# Patient Record
Sex: MALE | Race: WHITE | URBAN - METROPOLITAN AREA
[De-identification: names, ages, dates, MRNs, and addresses within clinical notes are randomized per-mention and may not be internally consistent; named-entity substitution may affect disease eponyms.]

---

## 2024-01-08 ENCOUNTER — TELEPHONE (OUTPATIENT)
Dept: ENT CLINIC | Age: 69
End: 2024-01-08

## 2024-01-08 ENCOUNTER — PROCEDURE VISIT (OUTPATIENT)
Dept: AUDIOLOGY | Age: 69
End: 2024-01-08
Payer: COMMERCIAL

## 2024-01-08 ENCOUNTER — OFFICE VISIT (OUTPATIENT)
Dept: ENT CLINIC | Age: 69
End: 2024-01-08
Payer: COMMERCIAL

## 2024-01-08 VITALS
HEART RATE: 99 BPM | HEIGHT: 69 IN | WEIGHT: 215 LBS | OXYGEN SATURATION: 96 % | DIASTOLIC BLOOD PRESSURE: 77 MMHG | SYSTOLIC BLOOD PRESSURE: 110 MMHG | RESPIRATION RATE: 18 BRPM | BODY MASS INDEX: 31.84 KG/M2

## 2024-01-08 DIAGNOSIS — H93.13 TINNITUS OF BOTH EARS: ICD-10-CM

## 2024-01-08 DIAGNOSIS — Z01.818 PRE-OP TESTING: ICD-10-CM

## 2024-01-08 DIAGNOSIS — H61.23 BILATERAL IMPACTED CERUMEN: ICD-10-CM

## 2024-01-08 DIAGNOSIS — H90.A32 MIXED CONDUCTIVE AND SENSORINEURAL HEARING LOSS OF LEFT EAR WITH RESTRICTED HEARING OF RIGHT EAR: Primary | ICD-10-CM

## 2024-01-08 DIAGNOSIS — H91.8X3 ASYMMETRICAL HEARING LOSS: ICD-10-CM

## 2024-01-08 DIAGNOSIS — H69.92 DYSFUNCTION OF LEFT EUSTACHIAN TUBE: ICD-10-CM

## 2024-01-08 DIAGNOSIS — H90.6 MIXED HEARING LOSS, BILATERAL: Primary | ICD-10-CM

## 2024-01-08 PROCEDURE — 1123F ACP DISCUSS/DSCN MKR DOCD: CPT

## 2024-01-08 PROCEDURE — 69210 REMOVE IMPACTED EAR WAX UNI: CPT

## 2024-01-08 PROCEDURE — 92557 COMPREHENSIVE HEARING TEST: CPT | Performed by: AUDIOLOGIST

## 2024-01-08 PROCEDURE — 92567 TYMPANOMETRY: CPT | Performed by: AUDIOLOGIST

## 2024-01-08 PROCEDURE — 99204 OFFICE O/P NEW MOD 45 MIN: CPT

## 2024-01-08 RX ORDER — FLUTICASONE PROPIONATE 50 MCG
2 SPRAY, SUSPENSION (ML) NASAL DAILY
Qty: 16 G | Refills: 1 | Status: SHIPPED | OUTPATIENT
Start: 2024-01-08

## 2024-01-08 ASSESSMENT — ENCOUNTER SYMPTOMS
BACK PAIN: 0
VOMITING: 0
EYE PAIN: 0
SINUS PRESSURE: 0
COUGH: 0
SORE THROAT: 0
SHORTNESS OF BREATH: 0
EYE DISCHARGE: 0
ALLERGIC/IMMUNOLOGIC NEGATIVE: 1
DIARRHEA: 0
RHINORRHEA: 0

## 2024-01-08 NOTE — TELEPHONE ENCOUNTER
[facility] to authorize order with patient insurance. Patient is scheduled for MRI IAC with radiology on 1/29/24 @ 11:00am Patient has been notified of date and time and that they need to arrive at 10:15am. Patient was informed no metals/jewelry. Patient instructed to park in [facility] parking lot and report to registration. Patient's wife Nevaeh verbalized understanding.      Electronically signed by Shauna Stokes MA on 1/8/24 at 1:37 PM EST

## 2024-01-08 NOTE — TELEPHONE ENCOUNTER
I called [facility] at [phone] to obtain last hearing test from October. Office is not open on Mondays, will try again tomorrow.     Electronically signed by Shauna Stokes MA on 1/8/24 at 8:47 AM EST

## 2024-01-08 NOTE — PROGRESS NOTES
Pre-op testing  Bun/Creatinine Ratio w/ EGFR            Plan:      Les is a 68-year-old male who presents to the office today as a new patient for evaluation of bilateral hearing loss.  Patient states he has several year history of occupational noise exposure but states that 7 months ago noticed drastic changes in his hearing.  He was previously seen by an audiologist and treated with hearing aids.  He states that his hearing has fluctuated since that time and feels that the hearing aids are not working.  Examination revealed bilateral partially obstructed cerumen impactions.  As noted above bilateral cerumen removal was completed in the office and the patient tolerated the procedure well.  Due to the fluctuating hearing loss a complete audiogram was performed and revealed Moderately severe to severe mixed hearing loss in the left ear and severe to profound mixed hearing loss in the right ear  with noted asymmetry to the right ear.  Due to the left-sided eustachian tube dysfunction and nasal congestion I would like the patient to initiate use of Flonase 1 spray to each nostril twice daily.  For the noted asymmetry an MRI of the IAC with contrast will be ordered to rule out acoustic neuroma.  Precontrast BUN and creatinine will be obtained.  Due to the severity of his hearing loss I will have him follow-up with Dr. Marcia De Leon for further evaluation up to including possible right-sided CI patient did verbalize understanding was in agreement to this plan.  He was instructed to call the office for any new or worsening symptoms prior to his next appointment.    Follow up in 6 week(s)    RX given today:  Luz Cordon MSN, FNP-BC  [facility] - Ear, Nose [facility] Throat    The information contained in this note has been dictated using drug and medical speech recognition software and may contain errors

## 2024-01-09 NOTE — TELEPHONE ENCOUNTER
[facility] spoke with [facility], Jared will have hearing test faxed to our office.     Electronically signed by Shauna Stokes MA on 1/9/24 at 11:20 AM EST

## 2024-01-09 NOTE — TELEPHONE ENCOUNTER
I spoke with Lalit at [facility] [place]. He agreed to send hearing test but in return requested hearing test completed in our office. I spoke with patient's wife Jrodyn who stated she is not comfortable with us faxing  hearing test to their facility.     Electronically signed by Shauna Stokes MA on 1/9/24 at 12:28 PM EST

## 2024-01-10 NOTE — PROGRESS NOTES
This patient was referred for audiometric/tympanometric testing by CASSIDY Diaz due to hearing loss and tinnitus.      Audiometry using pure tone air and bone conduction revealed a severe to profound mixed hearing loss in the right ear and a severe sensorineural hearing loss in the left ear   Reliability was good. Speech reception thresholds were in fair agreement with the pure tone averages, in the left ear and good agreement in the right ear.  Speech discrimination scores were fair, in the left ear and poor in the right ear.     Tympanometry revealed negative middle ear pressure (-113 daPa), in the left ear and peak pressure and compliance within normal limits in the right ear.           The results were reviewed with the patient and CNP.     Recommendations for follow up will be made pending physician consult.    Daniella Whitmore/CCC-A  [place] Lic # [id number]

## 2024-01-22 ENCOUNTER — HOSPITAL ENCOUNTER (OUTPATIENT)
Age: 69
Discharge: HOME | End: 2024-01-22
Payer: COMMERCIAL

## 2024-01-22 DIAGNOSIS — Z01.818 PRE-OP TESTING: ICD-10-CM

## 2024-01-22 LAB
BUN SERPL-MCNC: 17 MG/DL (ref 6–23)
CREAT SERPL-MCNC: 1.2 MG/DL (ref 0.7–1.2)
GFR SERPL CREATININE-BSD FRML MDRD: >60 ML/MIN/1.73M2

## 2024-01-22 PROCEDURE — 36415 COLL VENOUS BLD VENIPUNCTURE: CPT

## 2024-01-22 PROCEDURE — 84520 ASSAY OF UREA NITROGEN: CPT

## 2024-01-22 PROCEDURE — 82565 ASSAY OF CREATININE: CPT

## 2024-01-24 ENCOUNTER — TELEPHONE (OUTPATIENT)
Dept: ADMINISTRATIVE | Age: 69
End: 2024-01-24

## 2024-01-24 NOTE — TELEPHONE ENCOUNTER
Patient Appointment Form:      PCP: Vanessa  Referring: pcp    Has the Patient:    Seen a Cardiologist? no    Had a heart catheterization? no    Had heart surgery? no    Had a stress test or nuclear stress test? no    Had an echocardiogram? no    Had a vascular ultrasound? no    Had a 24/48 heart monitor or extended cardiac event monitor? no    Had recent blood work in the last 6 months? no    Had a pacemaker/ICD/ILR implant? no    Seen an Electrophysiologist? no        Will send records via: in Epic      Date & time of appointment:  jorge Hanson 2/12 at 8:15

## 2024-01-29 ENCOUNTER — HOSPITAL ENCOUNTER (OUTPATIENT)
Dept: MRI IMAGING | Age: 69
Discharge: HOME | End: 2024-01-31
Payer: COMMERCIAL

## 2024-01-29 DIAGNOSIS — H91.8X3 ASYMMETRICAL HEARING LOSS: ICD-10-CM

## 2024-01-29 PROCEDURE — 70553 MRI BRAIN STEM W/O & W/DYE: CPT

## 2024-01-29 PROCEDURE — A9579 GAD-BASE MR CONTRAST NOS,1ML: HCPCS | Performed by: RADIOLOGY

## 2024-01-29 PROCEDURE — 6360000004 HC RX CONTRAST MEDICATION: Performed by: RADIOLOGY

## 2024-01-29 RX ADMIN — GADOTERIDOL 20 ML: 279.3 INJECTION, SOLUTION INTRAVENOUS at 12:01

## 2024-01-30 ENCOUNTER — TELEPHONE (OUTPATIENT)
Dept: ENT CLINIC | Age: 69
End: 2024-01-30

## 2024-01-30 NOTE — TELEPHONE ENCOUNTER
LVM for patient to find out if he would like to keep appt with [facility] on 2/19 or cancel due to being scheduled with Dr. De Leon on 2/12    Electronically signed by Shauna Stokes MA on 1/30/24 at 1:39 PM EST

## 2024-02-09 PROBLEM — R07.9 CHEST PAIN: Status: ACTIVE | Noted: 2024-02-09

## 2024-02-09 NOTE — PROGRESS NOTES
NEW PATIENT VISIT  Chief Complaint   Patient presents with    New Patient     OV F/U MRI/HL     History of Present Illness:     Les Romero is a 69 y.o. male presenting with a history of noise exposure and sudden worsening 07/2023; wrs right 20% left 72%; MRI IAC done and noted to have normal anatomy; patient of [facility] Cordon; he notes that his hearing fluctuates and he has good and bad days. Today he states it was a good day and his hearing is about the same as his previous test. He has been using flonase once daily. 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; he still works, he has bilateral hearing aids         TOBACCO  Social History     Tobacco Use   Smoking Status Some Days    Types: Cigars   Smokeless Tobacco Never        ALCOHOL   Social History     Substance and Sexual Activity   Alcohol Use Yes    Comment: occ        DRUGHX   Social History     Substance and Sexual Activity   Drug Use Never        CURRENT OUTPATIENT MEDICATIONS:   Outpatient Medications Marked as Taking for the 2/12/24 encounter (Office Visit) with Marcia De Leon MD   Medication Sig Dispense Refill    amLODIPine (NORVASC) 10 MG tablet Take 1 tablet by mouth daily      allopurinol (ZYLOPRIM) 300 MG tablet Take 1 tablet by mouth daily      aspirin 81 MG EC tablet Take 1 tablet by mouth daily      carvedilol (COREG) 3.125 MG tablet Take 1 tablet by mouth 2 times daily      losartan-hydroCHLOROthiazide (HYZAAR) 100-25 MG per tablet Take 1 tablet by mouth daily      fluticasone (FLONASE) 50 MCG/ACT nasal spray 2 sprays by Nasal route daily 2 sprays each nostril once daily 16 g 1        ALLERGIES:   Allergies   Allergen Reactions    Ace Inhibitors        Timing Age of Onset >3 years   Duration Increasing in Severity fluctuating   Days of work missed in last year n/a      Modifying Factors Seasonal variation No        Associated Symptoms Mouth breathing No    Communication concerns Yes    Halitosis No     Family History Family members

## 2024-02-12 ENCOUNTER — OFFICE VISIT (OUTPATIENT)
Dept: ENT CLINIC | Age: 69
End: 2024-02-12
Payer: COMMERCIAL

## 2024-02-12 ENCOUNTER — PROCEDURE VISIT (OUTPATIENT)
Dept: AUDIOLOGY | Age: 69
End: 2024-02-12
Payer: COMMERCIAL

## 2024-02-12 ENCOUNTER — TELEPHONE (OUTPATIENT)
Dept: CARDIOLOGY CLINIC | Age: 69
End: 2024-02-12

## 2024-02-12 ENCOUNTER — OFFICE VISIT (OUTPATIENT)
Dept: CARDIOLOGY CLINIC | Age: 69
End: 2024-02-12
Payer: COMMERCIAL

## 2024-02-12 VITALS — HEIGHT: 69 IN | WEIGHT: 223 LBS | BODY MASS INDEX: 33.03 KG/M2

## 2024-02-12 VITALS
RESPIRATION RATE: 18 BRPM | WEIGHT: 223.8 LBS | HEART RATE: 96 BPM | BODY MASS INDEX: 33.15 KG/M2 | DIASTOLIC BLOOD PRESSURE: 91 MMHG | HEIGHT: 69 IN | SYSTOLIC BLOOD PRESSURE: 122 MMHG

## 2024-02-12 DIAGNOSIS — R07.9 CHEST PAIN, UNSPECIFIED TYPE: ICD-10-CM

## 2024-02-12 DIAGNOSIS — H93.13 TINNITUS OF BOTH EARS: Primary | ICD-10-CM

## 2024-02-12 DIAGNOSIS — H91.8X3 ASYMMETRICAL HEARING LOSS: Primary | ICD-10-CM

## 2024-02-12 DIAGNOSIS — I45.2 RBBB (RIGHT BUNDLE BRANCH BLOCK WITH LEFT ANTERIOR FASCICULAR BLOCK): ICD-10-CM

## 2024-02-12 DIAGNOSIS — H90.3 SENSORINEURAL HEARING LOSS (SNHL) OF BOTH EARS: ICD-10-CM

## 2024-02-12 DIAGNOSIS — R06.02 SOB (SHORTNESS OF BREATH): Primary | ICD-10-CM

## 2024-02-12 DIAGNOSIS — H91.8X3 ASYMMETRICAL HEARING LOSS: ICD-10-CM

## 2024-02-12 DIAGNOSIS — I10 HYPERTENSION, UNSPECIFIED TYPE: ICD-10-CM

## 2024-02-12 DIAGNOSIS — H90.6 MIXED HEARING LOSS, BILATERAL: ICD-10-CM

## 2024-02-12 DIAGNOSIS — R06.02 SOB (SHORTNESS OF BREATH): ICD-10-CM

## 2024-02-12 LAB
ANION GAP SERPL CALCULATED.3IONS-SCNC: 11 MMOL/L (ref 7–16)
BUN BLDV-MCNC: 17 MG/DL (ref 6–23)
CALCIUM SERPL-MCNC: 9.4 MG/DL (ref 8.6–10.2)
CHLORIDE BLD-SCNC: 103 MMOL/L (ref 98–107)
CO2: 23 MMOL/L (ref 22–29)
CREAT SERPL-MCNC: 1.2 MG/DL (ref 0.7–1.2)
GFR SERPL CREATININE-BSD FRML MDRD: >60 ML/MIN/1.73M2
GLUCOSE BLD-MCNC: 105 MG/DL (ref 74–99)
POTASSIUM SERPL-SCNC: 4.6 MMOL/L (ref 3.5–5)
PRO-BNP: 1617 PG/ML (ref 0–125)
SODIUM BLD-SCNC: 137 MMOL/L (ref 132–146)

## 2024-02-12 PROCEDURE — 99205 OFFICE O/P NEW HI 60 MIN: CPT | Performed by: INTERNAL MEDICINE

## 2024-02-12 PROCEDURE — 92553 AUDIOMETRY AIR & BONE: CPT | Performed by: AUDIOLOGIST

## 2024-02-12 PROCEDURE — 3080F DIAST BP >= 90 MM HG: CPT | Performed by: INTERNAL MEDICINE

## 2024-02-12 PROCEDURE — 99215 OFFICE O/P EST HI 40 MIN: CPT | Performed by: OTOLARYNGOLOGY

## 2024-02-12 PROCEDURE — 1123F ACP DISCUSS/DSCN MKR DOCD: CPT | Performed by: OTOLARYNGOLOGY

## 2024-02-12 PROCEDURE — 93000 ELECTROCARDIOGRAM COMPLETE: CPT | Performed by: INTERNAL MEDICINE

## 2024-02-12 PROCEDURE — 3074F SYST BP LT 130 MM HG: CPT | Performed by: INTERNAL MEDICINE

## 2024-02-12 RX ORDER — REGADENOSON 0.08 MG/ML
0.4 INJECTION, SOLUTION INTRAVENOUS
Status: SHIPPED | OUTPATIENT
Start: 2024-02-12

## 2024-02-12 RX ORDER — TORSEMIDE 10 MG/1
10 TABLET ORAL DAILY
Qty: 30 TABLET | Refills: 5 | Status: SHIPPED | OUTPATIENT
Start: 2024-02-12

## 2024-02-12 RX ORDER — AMLODIPINE BESYLATE 10 MG/1
10 TABLET ORAL DAILY
COMMUNITY
End: 2024-02-12 | Stop reason: ALTCHOICE

## 2024-02-12 RX ORDER — CARVEDILOL 6.25 MG/1
6.25 TABLET ORAL 2 TIMES DAILY
Qty: 60 TABLET | Refills: 5 | Status: SHIPPED | OUTPATIENT
Start: 2024-02-12

## 2024-02-12 RX ORDER — ASPIRIN 81 MG/1
81 TABLET ORAL DAILY
COMMUNITY

## 2024-02-12 RX ORDER — ALLOPURINOL 300 MG/1
300 TABLET ORAL DAILY
COMMUNITY

## 2024-02-12 RX ORDER — CARVEDILOL 3.12 MG/1
3.12 TABLET ORAL 2 TIMES DAILY
COMMUNITY
End: 2024-02-12 | Stop reason: ALTCHOICE

## 2024-02-12 RX ORDER — LOSARTAN POTASSIUM AND HYDROCHLOROTHIAZIDE 25; 100 MG/1; MG/1
1 TABLET ORAL DAILY
COMMUNITY
End: 2024-02-12 | Stop reason: ALTCHOICE

## 2024-02-12 RX ORDER — CARVEDILOL 12.5 MG/1
12.5 TABLET ORAL DAILY
COMMUNITY
End: 2024-02-12

## 2024-02-13 ENCOUNTER — HOSPITAL ENCOUNTER (OUTPATIENT)
Dept: CARDIOLOGY | Age: 69
Discharge: HOME | End: 2024-02-15
Attending: INTERNAL MEDICINE
Payer: COMMERCIAL

## 2024-02-13 VITALS
WEIGHT: 223 LBS | SYSTOLIC BLOOD PRESSURE: 122 MMHG | HEART RATE: 96 BPM | DIASTOLIC BLOOD PRESSURE: 80 MMHG | BODY MASS INDEX: 33.03 KG/M2 | HEIGHT: 69 IN | RESPIRATION RATE: 16 BRPM

## 2024-02-13 VITALS
SYSTOLIC BLOOD PRESSURE: 122 MMHG | DIASTOLIC BLOOD PRESSURE: 91 MMHG | WEIGHT: 223 LBS | BODY MASS INDEX: 33.03 KG/M2 | HEIGHT: 69 IN

## 2024-02-13 DIAGNOSIS — R07.9 CHEST PAIN, UNSPECIFIED TYPE: ICD-10-CM

## 2024-02-13 DIAGNOSIS — I45.2 RBBB (RIGHT BUNDLE BRANCH BLOCK WITH LEFT ANTERIOR FASCICULAR BLOCK): ICD-10-CM

## 2024-02-13 DIAGNOSIS — R06.02 SOB (SHORTNESS OF BREATH): ICD-10-CM

## 2024-02-13 DIAGNOSIS — I10 HYPERTENSION, UNSPECIFIED TYPE: ICD-10-CM

## 2024-02-13 DIAGNOSIS — R06.02 SOB (SHORTNESS OF BREATH): Primary | ICD-10-CM

## 2024-02-13 LAB
ECHO AO ASC DIAM: 3.4 CM
ECHO AO ASCENDING AORTA INDEX: 1.57 CM/M2
ECHO AV AREA PEAK VELOCITY: 1.5 CM2
ECHO AV AREA VTI: 1.5 CM2
ECHO AV AREA/BSA PEAK VELOCITY: 0.7 CM2/M2
ECHO AV AREA/BSA VTI: 0.7 CM2/M2
ECHO AV CUSP MM: 1.8 CM
ECHO AV MEAN GRADIENT: 4 MMHG
ECHO AV MEAN VELOCITY: 1 M/S
ECHO AV PEAK GRADIENT: 7 MMHG
ECHO AV PEAK VELOCITY: 1.3 M/S
ECHO AV VELOCITY RATIO: 0.46
ECHO AV VTI: 21.2 CM
ECHO BSA: 2.22 M2
ECHO EST RA PRESSURE: 3 MMHG
ECHO LA DIAMETER INDEX: 1.94 CM/M2
ECHO LA DIAMETER: 4.2 CM
ECHO LA VOL A-L A2C: 88 ML (ref 18–58)
ECHO LA VOL A-L A4C: 95 ML (ref 18–58)
ECHO LA VOL MOD A2C: 86 ML (ref 18–58)
ECHO LA VOL MOD A4C: 87 ML (ref 18–58)
ECHO LA VOLUME AREA LENGTH: 93 ML
ECHO LA VOLUME INDEX A-L A2C: 41 ML/M2 (ref 16–34)
ECHO LA VOLUME INDEX A-L A4C: 44 ML/M2 (ref 16–34)
ECHO LA VOLUME INDEX AREA LENGTH: 43 ML/M2 (ref 16–34)
ECHO LA VOLUME INDEX MOD A2C: 40 ML/M2 (ref 16–34)
ECHO LA VOLUME INDEX MOD A4C: 40 ML/M2 (ref 16–34)
ECHO LV EDV A2C: 230 ML
ECHO LV EDV A4C: 242 ML
ECHO LV EDV BP: 244 ML (ref 67–155)
ECHO LV EDV INDEX A4C: 112 ML/M2
ECHO LV EDV INDEX BP: 113 ML/M2
ECHO LV EDV NDEX A2C: 106 ML/M2
ECHO LV EJECTION FRACTION A2C: 19 %
ECHO LV EJECTION FRACTION A4C: 21 %
ECHO LV EJECTION FRACTION BIPLANE: 21 % (ref 55–100)
ECHO LV ESV A2C: 187 ML
ECHO LV ESV A4C: 191 ML
ECHO LV ESV BP: 193 ML (ref 22–58)
ECHO LV ESV INDEX A2C: 87 ML/M2
ECHO LV ESV INDEX A4C: 88 ML/M2
ECHO LV ESV INDEX BP: 89 ML/M2
ECHO LV FRACTIONAL SHORTENING: 7 % (ref 28–44)
ECHO LV INTERNAL DIMENSION DIASTOLE INDEX: 2.69 CM/M2
ECHO LV INTERNAL DIMENSION DIASTOLIC: 5.8 CM (ref 4.2–5.9)
ECHO LV INTERNAL DIMENSION SYSTOLIC INDEX: 2.5 CM/M2
ECHO LV INTERNAL DIMENSION SYSTOLIC: 5.4 CM
ECHO LV ISOVOLUMETRIC RELAXATION TIME (IVRT): 65.7 MS
ECHO LV IVSD: 1.2 CM (ref 0.6–1)
ECHO LV IVSS: 1.5 CM
ECHO LV MASS 2D: 331.4 G (ref 88–224)
ECHO LV MASS INDEX 2D: 153.4 G/M2 (ref 49–115)
ECHO LV POSTERIOR WALL DIASTOLIC: 1.4 CM (ref 0.6–1)
ECHO LV POSTERIOR WALL SYSTOLIC: 1.5 CM
ECHO LV RELATIVE WALL THICKNESS RATIO: 0.48
ECHO LVOT AREA: 3.1 CM2
ECHO LVOT AV VTI INDEX: 0.46
ECHO LVOT DIAM: 2 CM
ECHO LVOT MEAN GRADIENT: 1 MMHG
ECHO LVOT PEAK GRADIENT: 1 MMHG
ECHO LVOT PEAK VELOCITY: 0.6 M/S
ECHO LVOT STROKE VOLUME INDEX: 14.1 ML/M2
ECHO LVOT SV: 30.5 ML
ECHO LVOT VTI: 9.7 CM
ECHO MV AREA VTI: 2.1 CM2
ECHO MV E DECELERATION TIME (DT): 189.1 MS
ECHO MV EROA PISA: 0.2 CM2
ECHO MV LVOT VTI INDEX: 1.46
ECHO MV MAX VELOCITY: 0.9 M/S
ECHO MV MEAN GRADIENT: 2 MMHG
ECHO MV MEAN VELOCITY: 0.6 M/S
ECHO MV PEAK GRADIENT: 3 MMHG
ECHO MV REGURGITANT ALIASING (NYQUIST) VELOCITY: 37 CM/S
ECHO MV REGURGITANT RADIUS PISA: 0.55 CM
ECHO MV REGURGITANT VELOCITY PISA: 4.3 M/S
ECHO MV REGURGITANT VOLUME PISA: 22 ML
ECHO MV REGURGITANT VTIA: 134.6 CM
ECHO MV VTI: 14.2 CM
ECHO PV MAX VELOCITY: 0.6 M/S
ECHO PV MEAN GRADIENT: 1 MMHG
ECHO PV MEAN VELOCITY: 0.4 M/S
ECHO PV PEAK GRADIENT: 1 MMHG
ECHO PV VTI: 10 CM
ECHO RIGHT VENTRICULAR SYSTOLIC PRESSURE (RVSP): 31 MMHG
ECHO RV INTERNAL DIMENSION: 4.4 CM
ECHO TV REGURGITANT MAX VELOCITY: 2.64 M/S
ECHO TV REGURGITANT PEAK GRADIENT: 28 MMHG

## 2024-02-13 PROCEDURE — A9500 TC99M SESTAMIBI: HCPCS | Performed by: INTERNAL MEDICINE

## 2024-02-13 PROCEDURE — 2580000003 HC RX 258: Performed by: INTERNAL MEDICINE

## 2024-02-13 PROCEDURE — 6360000004 HC RX CONTRAST MEDICATION: Performed by: INTERNAL MEDICINE

## 2024-02-13 PROCEDURE — 93306 TTE W/DOPPLER COMPLETE: CPT | Performed by: INTERNAL MEDICINE

## 2024-02-13 PROCEDURE — C8929 TTE W OR WO FOL WCON,DOPPLER: HCPCS

## 2024-02-13 PROCEDURE — 93017 CV STRESS TEST TRACING ONLY: CPT

## 2024-02-13 PROCEDURE — 6360000002 HC RX W HCPCS: Performed by: INTERNAL MEDICINE

## 2024-02-13 PROCEDURE — 3430000000 HC RX DIAGNOSTIC RADIOPHARMACEUTICAL: Performed by: INTERNAL MEDICINE

## 2024-02-13 RX ORDER — SODIUM CHLORIDE 0.9 % (FLUSH) 0.9 %
10 SYRINGE (ML) INJECTION PRN
Status: DISCONTINUED | OUTPATIENT
Start: 2024-02-13 | End: 2024-02-16 | Stop reason: HOSPADM

## 2024-02-13 RX ORDER — REGADENOSON 0.08 MG/ML
0.4 INJECTION, SOLUTION INTRAVENOUS
Status: COMPLETED | OUTPATIENT
Start: 2024-02-13 | End: 2024-02-13

## 2024-02-13 RX ORDER — TETRAKIS(2-METHOXYISOBUTYLISOCYANIDE)COPPER(I) TETRAFLUOROBORATE 1 MG/ML
9.6 INJECTION, POWDER, LYOPHILIZED, FOR SOLUTION INTRAVENOUS
Status: COMPLETED | OUTPATIENT
Start: 2024-02-13 | End: 2024-02-13

## 2024-02-13 RX ORDER — TETRAKIS(2-METHOXYISOBUTYLISOCYANIDE)COPPER(I) TETRAFLUOROBORATE 1 MG/ML
35.3 INJECTION, POWDER, LYOPHILIZED, FOR SOLUTION INTRAVENOUS
Status: COMPLETED | OUTPATIENT
Start: 2024-02-13 | End: 2024-02-13

## 2024-02-13 RX ADMIN — Medication 9.6 MILLICURIE: at 08:14

## 2024-02-13 RX ADMIN — SODIUM CHLORIDE, PRESERVATIVE FREE 10 ML: 5 INJECTION INTRAVENOUS at 08:15

## 2024-02-13 RX ADMIN — SODIUM CHLORIDE, PRESERVATIVE FREE 10 ML: 5 INJECTION INTRAVENOUS at 09:13

## 2024-02-13 RX ADMIN — SODIUM CHLORIDE, PRESERVATIVE FREE 10 ML: 5 INJECTION INTRAVENOUS at 09:12

## 2024-02-13 RX ADMIN — REGADENOSON 0.4 MG: 0.08 INJECTION, SOLUTION INTRAVENOUS at 09:12

## 2024-02-13 RX ADMIN — SODIUM CHLORIDE, PRESERVATIVE FREE 10 ML: 5 INJECTION INTRAVENOUS at 08:00

## 2024-02-13 RX ADMIN — PERFLUTREN 1.5 ML: 6.52 INJECTION, SUSPENSION INTRAVENOUS at 07:53

## 2024-02-13 RX ADMIN — Medication 35.3 MILLICURIE: at 09:13

## 2024-02-14 ENCOUNTER — TELEPHONE (OUTPATIENT)
Dept: CARDIOLOGY CLINIC | Age: 69
End: 2024-02-14

## 2024-02-14 LAB
ECHO BSA: 2.22 M2
NUC STRESS EJECTION FRACTION: 29 %
STRESS BASELINE DIAS BP: 80 MMHG
STRESS BASELINE HR: 92 BPM
STRESS BASELINE SYS BP: 122 MMHG
STRESS ESTIMATED WORKLOAD: 1 METS
STRESS PEAK DIAS BP: 88 MMHG
STRESS PEAK SYS BP: 122 MMHG
STRESS PERCENT HR ACHIEVED: 66 %
STRESS POST PEAK HR: 100 BPM
STRESS RATE PRESSURE PRODUCT: NORMAL BPM*MMHG
STRESS TARGET HR: 151 BPM
TID: 0.9

## 2024-02-14 PROCEDURE — 93016 CV STRESS TEST SUPVJ ONLY: CPT | Performed by: INTERNAL MEDICINE

## 2024-02-14 PROCEDURE — 78452 HT MUSCLE IMAGE SPECT MULT: CPT | Performed by: INTERNAL MEDICINE

## 2024-02-14 PROCEDURE — 93018 CV STRESS TEST I&R ONLY: CPT | Performed by: INTERNAL MEDICINE

## 2024-02-14 NOTE — TELEPHONE ENCOUNTER
----- Message from Nehemiah Hanson MD sent at 2/13/2024  4:25 PM EST -----  Echo shows weak heart  Stress report still pending  Make sure he is taking the entresto and doing the other med changes u told him last week  Give him OV for Monday 1:15 so we can go over testing, etc    ----- Message -----  From: Nehemiah Hanson MD  Sent: 2/13/2024  10:20 AM EST  To: Nehemiah Hanson MD

## 2024-02-15 NOTE — PROGRESS NOTES
This patient was referred for audiometric/tympanometric testing by Dr. De Leon due to hearing loss. Patient reported he has good and bad hearing days and that today is a better hearing day than last time we tested his hearing.       Audiometry using pure tone air and bone conduction revealed a moderately severe sensorineural hearing loss in the left ear.  Right ear revealed a severe to profound mostly sensorineural hearing loss.  Reliability was good. Hearing thresholds are better than 1/8/24 test bilaterally.       The results were reviewed with the patient and physician.     Above testing completed and billed, per verbal order, from ordering provider.  Provider requested test procedure be completed at this visit.      Daniella Whitmore/CCC-A  [place] Lic # [id number]

## 2024-02-16 PROBLEM — R06.02 SOB (SHORTNESS OF BREATH): Status: RESOLVED | Noted: 2024-02-12 | Resolved: 2024-02-16

## 2024-02-16 PROBLEM — R07.9 CHEST PAIN: Status: RESOLVED | Noted: 2024-02-09 | Resolved: 2024-02-16

## 2024-02-16 PROBLEM — I50.20 HFREF (HEART FAILURE WITH REDUCED EJECTION FRACTION) (HCC): Status: ACTIVE | Noted: 2024-02-16

## 2024-02-19 ENCOUNTER — OFFICE VISIT (OUTPATIENT)
Dept: CARDIOLOGY CLINIC | Age: 69
End: 2024-02-19
Payer: COMMERCIAL

## 2024-02-19 VITALS
BODY MASS INDEX: 31.58 KG/M2 | HEART RATE: 88 BPM | HEIGHT: 69 IN | WEIGHT: 213.2 LBS | DIASTOLIC BLOOD PRESSURE: 59 MMHG | SYSTOLIC BLOOD PRESSURE: 100 MMHG | RESPIRATION RATE: 18 BRPM

## 2024-02-19 DIAGNOSIS — I50.20 HFREF (HEART FAILURE WITH REDUCED EJECTION FRACTION) (HCC): ICD-10-CM

## 2024-02-19 DIAGNOSIS — R94.39 ABNORMAL NUCLEAR STRESS TEST: Primary | ICD-10-CM

## 2024-02-19 PROCEDURE — 99215 OFFICE O/P EST HI 40 MIN: CPT | Performed by: INTERNAL MEDICINE

## 2024-02-19 PROCEDURE — 1123F ACP DISCUSS/DSCN MKR DOCD: CPT | Performed by: INTERNAL MEDICINE

## 2024-02-19 PROCEDURE — 93000 ELECTROCARDIOGRAM COMPLETE: CPT | Performed by: INTERNAL MEDICINE

## 2024-02-19 PROCEDURE — 3074F SYST BP LT 130 MM HG: CPT | Performed by: INTERNAL MEDICINE

## 2024-02-19 PROCEDURE — 3078F DIAST BP <80 MM HG: CPT | Performed by: INTERNAL MEDICINE

## 2024-02-19 NOTE — PROGRESS NOTES
Chief Complaint   Patient presents with    Congestive Heart Failure       Patient Active Problem List    Diagnosis Date Noted    HFrEF (heart failure with reduced ejection fraction) ([facility]) 02/16/2024     Overview Note:     A. TTE 2/13/2024: EF 21%      RBBB (right bundle branch block with left anterior fascicular block) 02/12/2024    HTN (hypertension) 02/12/2024       Current Outpatient Medications   Medication Sig Dispense Refill    allopurinol (ZYLOPRIM) 300 MG tablet Take 1 tablet by mouth daily      aspirin 81 MG EC tablet Take 1 tablet by mouth daily      carvedilol (COREG) 6.25 MG tablet Take 1 tablet by mouth 2 times daily 60 tablet 5    torsemide (DEMADEX) 10 MG tablet Take 1 tablet by mouth daily 30 tablet 5    sacubitril-valsartan (ENTRESTO) 49-51 MG per tablet Take 1 tablet by mouth 2 times daily 60 tablet 5    fluticasone (FLONASE) 50 MCG/ACT nasal spray 2 sprays by Nasal route daily 2 sprays each nostril once daily 16 g 1     No current facility-administered medications for this visit.        Allergies   Allergen Reactions    Ace Inhibitors        Vitals:    02/19/24 1319   BP: (!) 100/59   Pulse: 88   Resp: 18   Weight: 96.7 kg (213 lb 3.2 oz)   Height: 1.753 m (5' 9\")                 SUBJECTIVE: Les Romero presents to the office today for f/u after non invasive testing  Has adjusted meds, but only on Entresto 48 hours    He complains of NO MORE  dyspnea and orthopnea and denies   paroxysmal nocturnal dyspnea and syncope.  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 Well-developed and well-nourished. No distress.    Neck: No JVD present. Carotid bruit is not present.   Cardiovascular: Normal rate, regular rhythm, normal heart sounds and intact distal pulses.  No gallop and no friction rub.

## 2024-02-20 ENCOUNTER — TELEPHONE (OUTPATIENT)
Dept: CARDIOLOGY CLINIC | Age: 69
End: 2024-02-20

## 2024-02-20 DIAGNOSIS — I45.2 RBBB (RIGHT BUNDLE BRANCH BLOCK WITH LEFT ANTERIOR FASCICULAR BLOCK): ICD-10-CM

## 2024-02-20 DIAGNOSIS — I50.20 HFREF (HEART FAILURE WITH REDUCED EJECTION FRACTION) (HCC): ICD-10-CM

## 2024-02-20 DIAGNOSIS — R06.02 SOB (SHORTNESS OF BREATH) ON EXERTION: ICD-10-CM

## 2024-02-20 DIAGNOSIS — Z01.810 PREOPERATIVE CARDIOVASCULAR EXAMINATION: Primary | ICD-10-CM

## 2024-02-20 NOTE — TELEPHONE ENCOUNTER
Prior auth     Heart cath 24524    Dx: abnormnal stress test R94.39         Sob: R06.02         CHF I50.2    RH ordered     March 1, 2024 @ 7:30 am     [employer]

## 2024-02-21 ENCOUNTER — TELEPHONE (OUTPATIENT)
Dept: CARDIOLOGY CLINIC | Age: 69
End: 2024-02-21

## 2024-02-21 NOTE — TELEPHONE ENCOUNTER
Patients wife called and would like to know what restrictions he has for work.  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  Please advise as he will need a letter for work stating these.

## 2024-02-29 ENCOUNTER — HOSPITAL ENCOUNTER (OUTPATIENT)
Age: 69
Discharge: HOME | End: 2024-02-29
Payer: COMMERCIAL

## 2024-02-29 DIAGNOSIS — I50.20 HFREF (HEART FAILURE WITH REDUCED EJECTION FRACTION) (HCC): ICD-10-CM

## 2024-02-29 DIAGNOSIS — R06.02 SOB (SHORTNESS OF BREATH) ON EXERTION: ICD-10-CM

## 2024-02-29 DIAGNOSIS — Z01.810 PREOPERATIVE CARDIOVASCULAR EXAMINATION: ICD-10-CM

## 2024-02-29 LAB
ABO/RH: NORMAL
ALBUMIN SERPL-MCNC: 4.1 G/DL (ref 3.5–5.2)
ALP SERPL-CCNC: 82 U/L (ref 40–129)
ALT SERPL-CCNC: 36 U/L (ref 0–40)
ANION GAP SERPL CALCULATED.3IONS-SCNC: 10 MMOL/L (ref 7–16)
ANTIBODY SCREEN: NEGATIVE
ARM BAND NUMBER: NORMAL
AST SERPL-CCNC: 27 U/L (ref 0–39)
BILIRUB SERPL-MCNC: 0.4 MG/DL (ref 0–1.2)
BLOOD BANK SAMPLE EXPIRATION: NORMAL
BNP SERPL-MCNC: 719 PG/ML (ref 0–125)
BUN SERPL-MCNC: 22 MG/DL (ref 6–23)
CALCIUM SERPL-MCNC: 9.9 MG/DL (ref 8.6–10.2)
CHLORIDE SERPL-SCNC: 101 MMOL/L (ref 98–107)
CO2 SERPL-SCNC: 24 MMOL/L (ref 22–29)
CREAT SERPL-MCNC: 1.2 MG/DL (ref 0.7–1.2)
ERYTHROCYTE [DISTWIDTH] IN BLOOD BY AUTOMATED COUNT: 13.2 % (ref 11.5–15)
GFR SERPL CREATININE-BSD FRML MDRD: >60 ML/MIN/1.73M2
GLUCOSE SERPL-MCNC: 108 MG/DL (ref 74–99)
HCT VFR BLD AUTO: 48.4 % (ref 37–54)
HGB BLD-MCNC: 16.2 G/DL (ref 12.5–16.5)
INR PPP: 1
MCH RBC QN AUTO: 30.2 PG (ref 26–35)
MCHC RBC AUTO-ENTMCNC: 33.5 G/DL (ref 32–34.5)
MCV RBC AUTO: 90.1 FL (ref 80–99.9)
PARTIAL THROMBOPLASTIN TIME: 29.4 SEC (ref 24.5–35.1)
PLATELET # BLD AUTO: 240 K/UL (ref 130–450)
PMV BLD AUTO: 10.4 FL (ref 7–12)
POTASSIUM SERPL-SCNC: 4.7 MMOL/L (ref 3.5–5)
PROT SERPL-MCNC: 7.2 G/DL (ref 6.4–8.3)
PROTHROMBIN TIME: 11.7 SEC (ref 9.3–12.4)
RBC # BLD AUTO: 5.37 M/UL (ref 3.8–5.8)
SODIUM SERPL-SCNC: 135 MMOL/L (ref 132–146)
WBC OTHER # BLD: 7.4 K/UL (ref 4.5–11.5)

## 2024-02-29 PROCEDURE — 85730 THROMBOPLASTIN TIME PARTIAL: CPT

## 2024-02-29 PROCEDURE — 85027 COMPLETE CBC AUTOMATED: CPT

## 2024-02-29 PROCEDURE — 85610 PROTHROMBIN TIME: CPT

## 2024-02-29 PROCEDURE — 83880 ASSAY OF NATRIURETIC PEPTIDE: CPT

## 2024-02-29 PROCEDURE — 36415 COLL VENOUS BLD VENIPUNCTURE: CPT

## 2024-02-29 PROCEDURE — 80053 COMPREHEN METABOLIC PANEL: CPT

## 2024-03-01 ENCOUNTER — TELEPHONE (OUTPATIENT)
Dept: CARDIOLOGY CLINIC | Age: 69
End: 2024-03-01

## 2024-03-01 ENCOUNTER — HOSPITAL ENCOUNTER (OUTPATIENT)
Age: 69
Setting detail: OUTPATIENT SURGERY
Discharge: HOME | End: 2024-03-01
Payer: COMMERCIAL

## 2024-03-01 VITALS
WEIGHT: 215 LBS | SYSTOLIC BLOOD PRESSURE: 107 MMHG | TEMPERATURE: 97.6 F | DIASTOLIC BLOOD PRESSURE: 78 MMHG | HEART RATE: 80 BPM | RESPIRATION RATE: 18 BRPM | BODY MASS INDEX: 31.84 KG/M2 | HEIGHT: 69 IN | OXYGEN SATURATION: 96 %

## 2024-03-01 DIAGNOSIS — I50.20 SYSTOLIC HEART FAILURE, UNSPECIFIED HF CHRONICITY (HCC): ICD-10-CM

## 2024-03-01 DIAGNOSIS — I50.20 HFREF (HEART FAILURE WITH REDUCED EJECTION FRACTION) (HCC): Primary | ICD-10-CM

## 2024-03-01 LAB — ECHO BSA: 2.18 M2

## 2024-03-01 PROCEDURE — 7100000010 HC PHASE II RECOVERY - FIRST 15 MIN: Performed by: INTERNAL MEDICINE

## 2024-03-01 PROCEDURE — NBSRV NON-BILLABLE SERVICE: Performed by: INTERNAL MEDICINE

## 2024-03-01 PROCEDURE — C1769 GUIDE WIRE: HCPCS | Performed by: INTERNAL MEDICINE

## 2024-03-01 PROCEDURE — C1894 INTRO/SHEATH, NON-LASER: HCPCS | Performed by: INTERNAL MEDICINE

## 2024-03-01 PROCEDURE — 6360000004 HC RX CONTRAST MEDICATION: Performed by: INTERNAL MEDICINE

## 2024-03-01 PROCEDURE — 7100000011 HC PHASE II RECOVERY - ADDTL 15 MIN: Performed by: INTERNAL MEDICINE

## 2024-03-01 PROCEDURE — 93458 L HRT ARTERY/VENTRICLE ANGIO: CPT | Performed by: INTERNAL MEDICINE

## 2024-03-01 PROCEDURE — 2500000003 HC RX 250 WO HCPCS: Performed by: INTERNAL MEDICINE

## 2024-03-01 PROCEDURE — 6370000000 HC RX 637 (ALT 250 FOR IP): Performed by: INTERNAL MEDICINE

## 2024-03-01 PROCEDURE — 6360000002 HC RX W HCPCS: Performed by: INTERNAL MEDICINE

## 2024-03-01 PROCEDURE — 2709999900 HC NON-CHARGEABLE SUPPLY: Performed by: INTERNAL MEDICINE

## 2024-03-01 RX ORDER — NITROGLYCERIN 20 MG/100ML
INJECTION INTRAVENOUS PRN
Status: DISCONTINUED | OUTPATIENT
Start: 2024-03-01 | End: 2024-03-01 | Stop reason: HOSPADM

## 2024-03-01 RX ORDER — VERAPAMIL HYDROCHLORIDE 2.5 MG/ML
INJECTION, SOLUTION INTRAVENOUS PRN
Status: DISCONTINUED | OUTPATIENT
Start: 2024-03-01 | End: 2024-03-01 | Stop reason: HOSPADM

## 2024-03-01 RX ORDER — FENTANYL CITRATE 50 UG/ML
INJECTION, SOLUTION INTRAMUSCULAR; INTRAVENOUS PRN
Status: DISCONTINUED | OUTPATIENT
Start: 2024-03-01 | End: 2024-03-01 | Stop reason: HOSPADM

## 2024-03-01 RX ORDER — SODIUM CHLORIDE 0.9 % (FLUSH) 0.9 %
5-40 SYRINGE (ML) INJECTION PRN
Status: DISCONTINUED | OUTPATIENT
Start: 2024-03-01 | End: 2024-03-01 | Stop reason: HOSPADM

## 2024-03-01 RX ORDER — SODIUM CHLORIDE 0.9 % (FLUSH) 0.9 %
5-40 SYRINGE (ML) INJECTION EVERY 12 HOURS SCHEDULED
Status: DISCONTINUED | OUTPATIENT
Start: 2024-03-01 | End: 2024-03-01 | Stop reason: HOSPADM

## 2024-03-01 RX ORDER — ACETAMINOPHEN 325 MG/1
650 TABLET ORAL EVERY 4 HOURS PRN
Status: DISCONTINUED | OUTPATIENT
Start: 2024-03-01 | End: 2024-03-01 | Stop reason: HOSPADM

## 2024-03-01 RX ORDER — MIDAZOLAM HYDROCHLORIDE 1 MG/ML
INJECTION INTRAMUSCULAR; INTRAVENOUS PRN
Status: DISCONTINUED | OUTPATIENT
Start: 2024-03-01 | End: 2024-03-01 | Stop reason: HOSPADM

## 2024-03-01 RX ORDER — SODIUM CHLORIDE 9 MG/ML
INJECTION, SOLUTION INTRAVENOUS PRN
Status: DISCONTINUED | OUTPATIENT
Start: 2024-03-01 | End: 2024-03-01 | Stop reason: HOSPADM

## 2024-03-01 RX ORDER — ASPIRIN 325 MG
325 TABLET ORAL ONCE
Status: COMPLETED | OUTPATIENT
Start: 2024-03-01 | End: 2024-03-01

## 2024-03-01 RX ORDER — HEPARIN SODIUM 10000 [USP'U]/ML
INJECTION, SOLUTION INTRAVENOUS; SUBCUTANEOUS PRN
Status: DISCONTINUED | OUTPATIENT
Start: 2024-03-01 | End: 2024-03-01 | Stop reason: HOSPADM

## 2024-03-01 RX ADMIN — ASPIRIN 325 MG ORAL TABLET 325 MG: 325 PILL ORAL at 06:49

## 2024-03-01 ASSESSMENT — ENCOUNTER SYMPTOMS
BACK PAIN: 0
BLOOD IN STOOL: 0
COUGH: 0
CHEST TIGHTNESS: 0
SHORTNESS OF BREATH: 0
ABDOMINAL PAIN: 0
NAUSEA: 0
VOMITING: 0

## 2024-03-01 NOTE — H&P
History & Physical     CHIEF COMPLAINT: HFrEF      DATE OF SERVICE: 3/1/2024     HISTORY OF PRESENT ILLNESS:    69-year-old male with past medical history of HTN, RBBB, HFrEF (EF 25-30%) who was referred by Dr. Hanson for coronary angiography as a part of his LV dysfunction workup.  Patient denies having any chest pain or shortness of breath.  He reports that back in January he had symptoms of dyspnea on exertion but that when was the temperature subzero.  No symptoms currently.            Past Medical History:  Past Medical History:   Diagnosis Date    Hypertension        Past Surgical History:   Past Surgical History:   Procedure Laterality Date    KIDNEY REMOVAL      SHOULDER ARTHROPLASTY Right         Home Medications:    Prior to Admission medications    Medication Sig Start Date End Date Taking? Authorizing Provider   allopurinol (ZYLOPRIM) 300 MG tablet Take 1 tablet by mouth daily    Nuvia Sotelo MD   aspirin 81 MG EC tablet Take 1 tablet by mouth daily    Nuvia Sotelo MD   carvedilol (COREG) 6.25 MG tablet Take 1 tablet by mouth 2 times daily 2/12/24   Nehemiah Hanson MD   torsemide (DEMADEX) 10 MG tablet Take 1 tablet by mouth daily 2/12/24   Nehemiah Hanson MD   sacubitril-valsartan (ENTRESTO) 49-51 MG per tablet Take 1 tablet by mouth 2 times daily 2/12/24   Nehemiah Hanson MD   fluticasone (FLONASE) 50 MCG/ACT nasal spray 2 sprays by Nasal route daily 2 sprays each nostril once daily 1/8/24   [facility] APR[facility] - CNP       Hospital Medications:  No current facility-administered medications for this encounter.    Allergies:  Ace inhibitors    Social History:    Social History     Socioeconomic History    Marital status:      Spouse name: Not on file    Number of children: Not on file    Years of education: Not on file    Highest education level: Not on file   Occupational History    Not on file   Tobacco Use    Smoking status: Some Days     Types: Cigars    Smokeless tobacco:

## 2024-03-04 RX ORDER — SPIRONOLACTONE 25 MG/1
25 TABLET ORAL DAILY
Qty: 30 TABLET | Refills: 5 | Status: SHIPPED | OUTPATIENT
Start: 2024-03-04

## 2024-03-11 ENCOUNTER — HOSPITAL ENCOUNTER (OUTPATIENT)
Age: 69
Discharge: HOME | End: 2024-03-11
Payer: COMMERCIAL

## 2024-03-11 DIAGNOSIS — I50.20 HFREF (HEART FAILURE WITH REDUCED EJECTION FRACTION) (HCC): ICD-10-CM

## 2024-03-11 LAB
ANION GAP SERPL CALCULATED.3IONS-SCNC: 9 MMOL/L (ref 7–16)
BUN SERPL-MCNC: 21 MG/DL (ref 6–23)
CALCIUM SERPL-MCNC: 9.5 MG/DL (ref 8.6–10.2)
CHLORIDE SERPL-SCNC: 103 MMOL/L (ref 98–107)
CO2 SERPL-SCNC: 26 MMOL/L (ref 22–29)
CREAT SERPL-MCNC: 1.3 MG/DL (ref 0.7–1.2)
GFR SERPL CREATININE-BSD FRML MDRD: 58 ML/MIN/1.73M2
GLUCOSE SERPL-MCNC: 117 MG/DL (ref 74–99)
POTASSIUM SERPL-SCNC: 4.6 MMOL/L (ref 3.5–5)
SODIUM SERPL-SCNC: 138 MMOL/L (ref 132–146)

## 2024-03-11 PROCEDURE — 36415 COLL VENOUS BLD VENIPUNCTURE: CPT

## 2024-03-11 PROCEDURE — 80048 BASIC METABOLIC PNL TOTAL CA: CPT

## 2024-03-12 ENCOUNTER — TELEPHONE (OUTPATIENT)
Dept: CARDIOLOGY CLINIC | Age: 69
End: 2024-03-12

## 2024-03-12 NOTE — TELEPHONE ENCOUNTER
----- Message from Nehemiah Hanson MD sent at 3/11/2024 11:42 AM EDT -----  Labs good  Should already have an ov scheduled      ----- Message -----  From: Erwin Jeong Incoming Lab Results From [place]  Sent: 3/11/2024  11:39 AM EDT  To: Nehemiah Hanson MD

## 2024-03-15 ENCOUNTER — TELEPHONE (OUTPATIENT)
Dept: ENT CLINIC | Age: 69
End: 2024-03-15

## 2024-03-18 ENCOUNTER — OFFICE VISIT (OUTPATIENT)
Dept: CARDIOLOGY CLINIC | Age: 69
End: 2024-03-18
Payer: COMMERCIAL

## 2024-03-18 VITALS
HEIGHT: 68 IN | BODY MASS INDEX: 32.31 KG/M2 | DIASTOLIC BLOOD PRESSURE: 85 MMHG | SYSTOLIC BLOOD PRESSURE: 118 MMHG | HEART RATE: 94 BPM | RESPIRATION RATE: 18 BRPM | WEIGHT: 213.2 LBS

## 2024-03-18 DIAGNOSIS — I42.8 NICM (NONISCHEMIC CARDIOMYOPATHY) (HCC): Primary | ICD-10-CM

## 2024-03-18 PROCEDURE — 93000 ELECTROCARDIOGRAM COMPLETE: CPT | Performed by: INTERNAL MEDICINE

## 2024-03-18 PROCEDURE — 3079F DIAST BP 80-89 MM HG: CPT | Performed by: INTERNAL MEDICINE

## 2024-03-18 PROCEDURE — 3074F SYST BP LT 130 MM HG: CPT | Performed by: INTERNAL MEDICINE

## 2024-03-18 PROCEDURE — 1123F ACP DISCUSS/DSCN MKR DOCD: CPT | Performed by: INTERNAL MEDICINE

## 2024-03-18 PROCEDURE — 99214 OFFICE O/P EST MOD 30 MIN: CPT | Performed by: INTERNAL MEDICINE

## 2024-03-18 RX ORDER — FAMOTIDINE 20 MG/1
20 TABLET, FILM COATED ORAL 2 TIMES DAILY
COMMUNITY

## 2024-03-18 RX ORDER — LORATADINE 10 MG/1
10 TABLET ORAL DAILY
COMMUNITY

## 2024-03-18 NOTE — PROGRESS NOTES
Chief Complaint   Patient presents with    Cardiomyopathy       Patient Active Problem List    Diagnosis Date Noted    NICM (nonischemic cardiomyopathy) ([facility]) 03/18/2024     Overview Note:     A. Cath 3/2024: no obstructive disease, LVEF 25-30%, LVEDP 13      HFrEF (heart failure with reduced ejection fraction) ([facility]) 02/16/2024     Overview Note:     A. TTE 2/13/2024: EF 21%      RBBB (right bundle branch block with left anterior fascicular block) 02/12/2024    HTN (hypertension) 02/12/2024       Current Outpatient Medications   Medication Sig Dispense Refill    loratadine (CLARITIN) 10 MG tablet Take 1 tablet by mouth daily      famotidine (PEPCID) 20 MG tablet Take 1 tablet by mouth 2 times daily      spironolactone (ALDACTONE) 25 MG tablet Take 1 tablet by mouth daily 30 tablet 5    allopurinol (ZYLOPRIM) 300 MG tablet Take 1 tablet by mouth daily      aspirin 81 MG EC tablet Take 1 tablet by mouth daily      carvedilol (COREG) 6.25 MG tablet Take 1 tablet by mouth 2 times daily 60 tablet 5    torsemide (DEMADEX) 10 MG tablet Take 1 tablet by mouth daily 30 tablet 5    sacubitril-valsartan (ENTRESTO) 49-51 MG per tablet Take 1 tablet by mouth 2 times daily 60 tablet 5    fluticasone (FLONASE) 50 MCG/ACT nasal spray 2 sprays by Nasal route daily 2 sprays each nostril once daily 16 g 1     No current facility-administered medications for this visit.        Allergies   Allergen Reactions    Ace Inhibitors        Vitals:    03/18/24 0812   BP: 118/85   Pulse: 94   Resp: 18   Weight: 96.7 kg (213 lb 3.2 oz)   Height: 1.727 m (5' 8\")                 SUBJECTIVE: Les Romero presents to the office today for f/u after cath. I reviewed the actual cath images  Has adjusted meds,     He complains of NO MORE  dyspnea and orthopnea and denies   paroxysmal nocturnal dyspnea and syncope.  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

## 2024-03-18 NOTE — PATIENT INSTRUCTIONS
Start new medication - Jardiance - once per day   Increase your carvedilol to 2 pills twice a day   Schedule echo test after may 13

## 2024-04-01 ENCOUNTER — OFFICE VISIT (OUTPATIENT)
Dept: CARDIOLOGY CLINIC | Age: 69
End: 2024-04-01
Payer: COMMERCIAL

## 2024-04-01 VITALS
WEIGHT: 212.4 LBS | SYSTOLIC BLOOD PRESSURE: 119 MMHG | RESPIRATION RATE: 18 BRPM | DIASTOLIC BLOOD PRESSURE: 83 MMHG | BODY MASS INDEX: 31.46 KG/M2 | HEIGHT: 69 IN | HEART RATE: 82 BPM

## 2024-04-01 DIAGNOSIS — I50.20 HFREF (HEART FAILURE WITH REDUCED EJECTION FRACTION) (HCC): ICD-10-CM

## 2024-04-01 DIAGNOSIS — I50.20 HFREF (HEART FAILURE WITH REDUCED EJECTION FRACTION) (HCC): Primary | ICD-10-CM

## 2024-04-01 LAB
ANION GAP SERPL CALCULATED.3IONS-SCNC: 11 MMOL/L (ref 7–16)
BUN BLDV-MCNC: 26 MG/DL (ref 6–23)
CALCIUM SERPL-MCNC: 9.7 MG/DL (ref 8.6–10.2)
CHLORIDE BLD-SCNC: 101 MMOL/L (ref 98–107)
CO2: 26 MMOL/L (ref 22–29)
CREAT SERPL-MCNC: 1.4 MG/DL (ref 0.7–1.2)
GFR SERPL CREATININE-BSD FRML MDRD: 54 ML/MIN/1.73M2
GLUCOSE BLD-MCNC: 95 MG/DL (ref 74–99)
POTASSIUM SERPL-SCNC: 5 MMOL/L (ref 3.5–5)
SODIUM BLD-SCNC: 138 MMOL/L (ref 132–146)

## 2024-04-01 PROCEDURE — 1123F ACP DISCUSS/DSCN MKR DOCD: CPT | Performed by: INTERNAL MEDICINE

## 2024-04-01 PROCEDURE — 3074F SYST BP LT 130 MM HG: CPT | Performed by: INTERNAL MEDICINE

## 2024-04-01 PROCEDURE — 93000 ELECTROCARDIOGRAM COMPLETE: CPT | Performed by: INTERNAL MEDICINE

## 2024-04-01 PROCEDURE — 3079F DIAST BP 80-89 MM HG: CPT | Performed by: INTERNAL MEDICINE

## 2024-04-01 PROCEDURE — 99214 OFFICE O/P EST MOD 30 MIN: CPT | Performed by: INTERNAL MEDICINE

## 2024-04-01 RX ORDER — CARVEDILOL 12.5 MG/1
12.5 TABLET ORAL 2 TIMES DAILY
Qty: 60 TABLET | Refills: 5 | Status: SHIPPED | OUTPATIENT
Start: 2024-04-01

## 2024-04-01 NOTE — PROGRESS NOTES
Chief Complaint   Patient presents with    Cardiomyopathy       Patient Active Problem List    Diagnosis Date Noted    NICM (nonischemic cardiomyopathy) ([facility]) 03/18/2024     Overview Note:     A. Cath 3/2024: no obstructive disease, LVEF 25-30%, LVEDP 13      HFrEF (heart failure with reduced ejection fraction) ([facility]) 02/16/2024     Overview Note:     A. TTE 2/13/2024: EF 21%      RBBB (right bundle branch block with left anterior fascicular block) 02/12/2024    HTN (hypertension) 02/12/2024       Current Outpatient Medications   Medication Sig Dispense Refill    famotidine (PEPCID) 20 MG tablet Take 1 tablet by mouth 2 times daily      empagliflozin (JARDIANCE) 10 MG tablet Take 1 tablet by mouth daily 30 tablet 5    spironolactone (ALDACTONE) 25 MG tablet Take 1 tablet by mouth daily 30 tablet 5    allopurinol (ZYLOPRIM) 300 MG tablet Take 1 tablet by mouth daily      aspirin 81 MG EC tablet Take 1 tablet by mouth daily      carvedilol (COREG) 6.25 MG tablet Take 1 tablet by mouth 2 times daily 60 tablet 5    torsemide (DEMADEX) 10 MG tablet Take 1 tablet by mouth daily (Patient taking differently: Take 1 tablet by mouth daily Taking every other day) 30 tablet 5    sacubitril-valsartan (ENTRESTO) 49-51 MG per tablet Take 1 tablet by mouth 2 times daily 60 tablet 5     Current Facility-Administered Medications   Medication Dose Route Frequency Provider Last Rate Last Admin    perflutren lipid microspheres (DEFINITY) injection 1.5 mL  1.5 mL IntraVENous ONCE PRN Nehemiah Hanson MD            Allergies   Allergen Reactions    Ace Inhibitors        Vitals:    04/01/24 1027   BP: 119/83   Pulse: 82   Resp: 18   Weight: 96.3 kg (212 lb 6.4 oz)   Height: 1.753 m (5' 9\")                 SUBJECTIVE: Les Romero presents to the office today for f/u \  He has started the Jardiance but did not up the BB dose    He complains of NO MORE  dyspnea and orthopnea and denies   paroxysmal nocturnal dyspnea and syncope.  He is a 
Lab work drawn from RIGHT arm. Patient tolerated  procedure well.    OSWALD Meng MA     
,DirectAddress_Unknown

## 2024-04-02 ENCOUNTER — TELEPHONE (OUTPATIENT)
Dept: CARDIOLOGY CLINIC | Age: 69
End: 2024-04-02

## 2024-04-02 NOTE — TELEPHONE ENCOUNTER
----- Message from Nehemiah Hanson MD sent at 4/2/2024  6:33 AM EDT -----  Labs reviewed  Can youi ask mr alcaraz to only take ONE HALF tablet of spironolactone per day  Thx    ----- Message -----  From: [id number] - [facility] Incoming Lab Results From [place]  Sent: 4/1/2024   5:28 PM EDT  To: Nehemiah Hanson MD

## 2024-04-09 ENCOUNTER — TELEPHONE (OUTPATIENT)
Dept: ENT CLINIC | Age: 69
End: 2024-04-09

## 2024-04-09 ENCOUNTER — OFFICE VISIT (OUTPATIENT)
Dept: ENT CLINIC | Age: 69
End: 2024-04-09
Payer: COMMERCIAL

## 2024-04-09 ENCOUNTER — PROCEDURE VISIT (OUTPATIENT)
Dept: AUDIOLOGY | Age: 69
End: 2024-04-09
Payer: COMMERCIAL

## 2024-04-09 VITALS
HEIGHT: 69 IN | HEART RATE: 96 BPM | DIASTOLIC BLOOD PRESSURE: 77 MMHG | WEIGHT: 212 LBS | OXYGEN SATURATION: 97 % | TEMPERATURE: 97.2 F | BODY MASS INDEX: 31.4 KG/M2 | SYSTOLIC BLOOD PRESSURE: 114 MMHG

## 2024-04-09 DIAGNOSIS — H91.8X3 ASYMMETRICAL HEARING LOSS: Primary | ICD-10-CM

## 2024-04-09 DIAGNOSIS — H69.93 DYSFUNCTION OF BOTH EUSTACHIAN TUBES: Primary | ICD-10-CM

## 2024-04-09 DIAGNOSIS — H60.92 OTITIS EXTERNA OF LEFT EAR, UNSPECIFIED CHRONICITY, UNSPECIFIED TYPE: ICD-10-CM

## 2024-04-09 DIAGNOSIS — H61.23 BILATERAL IMPACTED CERUMEN: ICD-10-CM

## 2024-04-09 DIAGNOSIS — H90.6 MIXED HEARING LOSS, BILATERAL: ICD-10-CM

## 2024-04-09 DIAGNOSIS — H69.92 DYSFUNCTION OF LEFT EUSTACHIAN TUBE: ICD-10-CM

## 2024-04-09 PROCEDURE — 3078F DIAST BP <80 MM HG: CPT | Performed by: OTOLARYNGOLOGY

## 2024-04-09 PROCEDURE — 1123F ACP DISCUSS/DSCN MKR DOCD: CPT | Performed by: OTOLARYNGOLOGY

## 2024-04-09 PROCEDURE — 3074F SYST BP LT 130 MM HG: CPT | Performed by: OTOLARYNGOLOGY

## 2024-04-09 PROCEDURE — 99213 OFFICE O/P EST LOW 20 MIN: CPT | Performed by: OTOLARYNGOLOGY

## 2024-04-09 PROCEDURE — 92567 TYMPANOMETRY: CPT | Performed by: AUDIOLOGIST

## 2024-04-09 PROCEDURE — 69210 REMOVE IMPACTED EAR WAX UNI: CPT | Performed by: OTOLARYNGOLOGY

## 2024-04-09 RX ORDER — LORATADINE 10 MG/1
10 CAPSULE, LIQUID FILLED ORAL DAILY
COMMUNITY

## 2024-04-09 RX ORDER — FLUTICASONE PROPIONATE 50 MCG
1 SPRAY, SUSPENSION (ML) NASAL DAILY
COMMUNITY

## 2024-04-09 RX ORDER — FLUTICASONE PROPIONATE 50 MCG
2 SPRAY, SUSPENSION (ML) NASAL DAILY
Qty: 16 G | Refills: 5 | Status: SHIPPED | OUTPATIENT
Start: 2024-04-09

## 2024-04-09 NOTE — PROGRESS NOTES
This patient was referred for tympanometric testing by Dr. Carbajal due to eustachian tube dysfunction.     Tympanometry revealed normal middle ear peak pressure and compliance, in the right ear, and positive middle ear pressure, (+85 daPa), in the left ear.      Recommendations for follow up will be made pending physician consult.    Electronically signed by Daniella Almodovar on 4/9/2024 at 10:43 AM

## 2024-04-09 NOTE — TELEPHONE ENCOUNTER
Patient's wife called in with concerns regarding being told pt has a possible ear infection while at appt today. Per Dr. Carbajal he would like for patient to be re evaluated in 2 mo and is not concerned about an infection at this time due to tymp performed and impacted cerumen that was removed. If patient starts showing signs of infection he should call in for Rx or appt. Patient's wife understood.     Electronically signed by Shauna Stokes MA on 4/9/24 at 10:23 AM EDT

## 2024-04-09 NOTE — PROGRESS NOTES
Subjective:     Patient ID:  Les Romero is a 69 y.o. male.    HPI:    Hearing Loss  Patient presents today with complaints of hearing loss.  Concern regarding hearing has been present for 9 months. He has failed a prior hearing test.The patient reports talking loudly, difficulty hearing, saying \"huh\" or \"what\", poor response to loud noises, poor response to voice, doesn't turn head towards sounds.     Patient is currently wearing a hearing aid in the bilateral ear. Started wearing them in Oct of '23  History of Head trauma: no  History of surgery to the head/neck: no  History of cerumen impaction: yes  History of noise exposure: Yes  Spinning: no  Hearing loss: yes    Fluctuating: no  Aural pressure: yes  Tinnitus:no  Otalgia:no    Past Medical History:   Diagnosis Date    Hypertension      Past Surgical History:   Procedure Laterality Date    CARDIAC PROCEDURE N/A 3/1/2024    Left heart cath / coronary angiography performed by Mikel Hennessy MD at [facility] CARDIAC CATH LAB    KIDNEY REMOVAL      SHOULDER ARTHROPLASTY Right      Family History   Problem Relation Age of Onset    Heart Attack Mother 72    Heart Attack Father 65     Social History     Socioeconomic History    Marital status:      Spouse name: None    Number of children: None    Years of education: None    Highest education level: None   Tobacco Use    Smoking status: Former     Types: Cigars     Quit date: 2024     Years since quittin.1    Smokeless tobacco: Never   Vaping Use    Vaping Use: Never used   Substance and Sexual Activity    Alcohol use: Yes     Comment: occ    Drug use: Never     Allergies   Allergen Reactions    Ace Inhibitors          Review of Systems   Constitutional: Negative.    HENT:  Positive for hearing loss. Negative for congestion, ear discharge, ear pain, postnasal drip, rhinorrhea, sinus pressure and tinnitus.    Eyes: Negative.    Respiratory: Negative.     Cardiovascular: Negative.    Gastrointestinal: Negative.  [Time Spent: ___ minutes] : I have spent [unfilled] minutes of time on the encounter. [TextEntry] : Discussed with pt at length regarding asthma, SOBOE, weight issues, Covid infection; reviewed prior w/u with pt as above.

## 2024-05-14 ENCOUNTER — HOSPITAL ENCOUNTER (OUTPATIENT)
Dept: CARDIOLOGY | Age: 69
Discharge: HOME | End: 2024-05-16
Attending: INTERNAL MEDICINE
Payer: COMMERCIAL

## 2024-05-14 VITALS
BODY MASS INDEX: 31.4 KG/M2 | HEIGHT: 69 IN | DIASTOLIC BLOOD PRESSURE: 77 MMHG | WEIGHT: 212 LBS | SYSTOLIC BLOOD PRESSURE: 114 MMHG

## 2024-05-14 DIAGNOSIS — I42.8 NICM (NONISCHEMIC CARDIOMYOPATHY) (HCC): ICD-10-CM

## 2024-05-14 DIAGNOSIS — I42.9 CARDIOMYOPATHY (HCC): ICD-10-CM

## 2024-05-14 LAB
ECHO AV AREA PEAK VELOCITY: 2.4 CM2
ECHO AV AREA VTI: 2.5 CM2
ECHO AV AREA/BSA PEAK VELOCITY: 1.1 CM2/M2
ECHO AV AREA/BSA VTI: 1.2 CM2/M2
ECHO AV MEAN GRADIENT: 4 MMHG
ECHO AV MEAN VELOCITY: 1 M/S
ECHO AV PEAK GRADIENT: 7 MMHG
ECHO AV PEAK VELOCITY: 1.3 M/S
ECHO AV VELOCITY RATIO: 0.54
ECHO AV VTI: 25.2 CM
ECHO BSA: 2.16 M2
ECHO LA DIAMETER INDEX: 1.7 CM/M2
ECHO LA DIAMETER: 3.6 CM
ECHO LA VOL A-L A2C: 48 ML (ref 18–58)
ECHO LA VOL A-L A4C: 44 ML (ref 18–58)
ECHO LA VOL MOD A2C: 46 ML (ref 18–58)
ECHO LA VOL MOD A4C: 43 ML (ref 18–58)
ECHO LA VOLUME AREA LENGTH: 49 ML
ECHO LA VOLUME INDEX A-L A2C: 23 ML/M2 (ref 16–34)
ECHO LA VOLUME INDEX A-L A4C: 21 ML/M2 (ref 16–34)
ECHO LA VOLUME INDEX AREA LENGTH: 23 ML/M2 (ref 16–34)
ECHO LA VOLUME INDEX MOD A2C: 22 ML/M2 (ref 16–34)
ECHO LA VOLUME INDEX MOD A4C: 20 ML/M2 (ref 16–34)
ECHO LV EDV A2C: 139 ML
ECHO LV EDV A4C: 148 ML
ECHO LV EDV BP: 144 ML (ref 67–155)
ECHO LV EDV INDEX A4C: 70 ML/M2
ECHO LV EDV INDEX BP: 68 ML/M2
ECHO LV EDV NDEX A2C: 66 ML/M2
ECHO LV EJECTION FRACTION A2C: 36 %
ECHO LV EJECTION FRACTION A4C: 35 %
ECHO LV EJECTION FRACTION BIPLANE: 34 % (ref 55–100)
ECHO LV ESV A2C: 89 ML
ECHO LV ESV A4C: 97 ML
ECHO LV ESV BP: 94 ML (ref 22–58)
ECHO LV ESV INDEX A2C: 42 ML/M2
ECHO LV ESV INDEX A4C: 46 ML/M2
ECHO LV ESV INDEX BP: 44 ML/M2
ECHO LV FRACTIONAL SHORTENING: 12 % (ref 28–44)
ECHO LV INTERNAL DIMENSION DIASTOLE INDEX: 2.31 CM/M2
ECHO LV INTERNAL DIMENSION DIASTOLIC: 4.9 CM (ref 4.2–5.9)
ECHO LV INTERNAL DIMENSION SYSTOLIC INDEX: 2.03 CM/M2
ECHO LV INTERNAL DIMENSION SYSTOLIC: 4.3 CM
ECHO LV IVSD: 1.5 CM (ref 0.6–1)
ECHO LV MASS 2D: 297.5 G (ref 88–224)
ECHO LV MASS INDEX 2D: 140.4 G/M2 (ref 49–115)
ECHO LV POSTERIOR WALL DIASTOLIC: 1.4 CM (ref 0.6–1)
ECHO LV RELATIVE WALL THICKNESS RATIO: 0.57
ECHO LVOT AREA: 4.5 CM2
ECHO LVOT AV VTI INDEX: 0.57
ECHO LVOT DIAM: 2.4 CM
ECHO LVOT MEAN GRADIENT: 1 MMHG
ECHO LVOT PEAK GRADIENT: 2 MMHG
ECHO LVOT PEAK VELOCITY: 0.7 M/S
ECHO LVOT STROKE VOLUME INDEX: 30.5 ML/M2
ECHO LVOT SV: 64.7 ML
ECHO LVOT VTI: 14.3 CM
ECHO MV AREA PHT: 3.7 CM2
ECHO MV AREA VTI: 3.4 CM2
ECHO MV LVOT VTI INDEX: 1.33
ECHO MV MAX VELOCITY: 0.9 M/S
ECHO MV MEAN GRADIENT: 2 MMHG
ECHO MV MEAN VELOCITY: 0.6 M/S
ECHO MV PEAK GRADIENT: 3 MMHG
ECHO MV PRESSURE HALF TIME (PHT): 59.3 MS
ECHO MV VTI: 19 CM
ECHO RV INTERNAL DIMENSION: 4.3 CM

## 2024-05-14 PROCEDURE — C8924 2D TTE W OR W/O FOL W/CON,FU: HCPCS

## 2024-05-14 PROCEDURE — 6360000004 HC RX CONTRAST MEDICATION: Performed by: INTERNAL MEDICINE

## 2024-05-14 PROCEDURE — 2580000003 HC RX 258: Performed by: INTERNAL MEDICINE

## 2024-05-14 RX ORDER — SODIUM CHLORIDE 0.9 % (FLUSH) 0.9 %
10 SYRINGE (ML) INJECTION PRN
Status: DISCONTINUED | OUTPATIENT
Start: 2024-05-14 | End: 2024-05-17 | Stop reason: HOSPADM

## 2024-05-14 RX ADMIN — SODIUM CHLORIDE, PRESERVATIVE FREE 10 ML: 5 INJECTION INTRAVENOUS at 10:18

## 2024-05-14 RX ADMIN — PERFLUTREN 1.5 ML: 6.52 INJECTION, SUSPENSION INTRAVENOUS at 10:18

## 2024-05-15 ENCOUNTER — TELEPHONE (OUTPATIENT)
Dept: CARDIOLOGY CLINIC | Age: 69
End: 2024-05-15

## 2024-05-15 DIAGNOSIS — I50.20 HFREF (HEART FAILURE WITH REDUCED EJECTION FRACTION) (HCC): ICD-10-CM

## 2024-05-15 DIAGNOSIS — I42.8 NICM (NONISCHEMIC CARDIOMYOPATHY) (HCC): Primary | ICD-10-CM

## 2024-05-15 NOTE — TELEPHONE ENCOUNTER
Spoke with patients wife and discussed echo results and referral.  She will call me tomorrow and she will go over everything else as she is not at home.

## 2024-05-15 NOTE — TELEPHONE ENCOUNTER
----- Message from Nehemiah Hanson MD sent at 5/14/2024 10:41 AM EDT -----  1.  Let him know echo shows heart muscle is stronger, but may possibly need ICD - refer to EP please if they are not already seeing him    2. Make sure he did up his coreg dose to 12.5 bid    OV with me 3 months    Thx    ----- Message -----  From: Nehemiah Hanson MD  Sent: 5/14/2024  10:40 AM EDT  To: Nehemiah Hanson MD

## 2024-05-16 ENCOUNTER — TELEPHONE (OUTPATIENT)
Dept: ADMINISTRATIVE | Age: 69
End: 2024-05-16

## 2024-05-17 NOTE — TELEPHONE ENCOUNTER
He is a  and wants to know if you can release him to drive.  He has to be at 450% or higher.  If not do you know when he might be able to.  Please advise

## 2024-06-11 ENCOUNTER — OFFICE VISIT (OUTPATIENT)
Dept: ENT CLINIC | Age: 69
End: 2024-06-11
Payer: COMMERCIAL

## 2024-06-11 VITALS
HEART RATE: 81 BPM | SYSTOLIC BLOOD PRESSURE: 102 MMHG | OXYGEN SATURATION: 97 % | BODY MASS INDEX: 31.21 KG/M2 | WEIGHT: 210.7 LBS | TEMPERATURE: 97.2 F | DIASTOLIC BLOOD PRESSURE: 71 MMHG | HEIGHT: 69 IN

## 2024-06-11 DIAGNOSIS — H69.92 DYSFUNCTION OF LEFT EUSTACHIAN TUBE: ICD-10-CM

## 2024-06-11 DIAGNOSIS — H91.8X3 ASYMMETRICAL HEARING LOSS: Primary | ICD-10-CM

## 2024-06-11 DIAGNOSIS — H61.23 BILATERAL IMPACTED CERUMEN: ICD-10-CM

## 2024-06-11 DIAGNOSIS — H60.92 OTITIS EXTERNA OF LEFT EAR, UNSPECIFIED CHRONICITY, UNSPECIFIED TYPE: ICD-10-CM

## 2024-06-11 DIAGNOSIS — H90.6 MIXED HEARING LOSS, BILATERAL: ICD-10-CM

## 2024-06-11 PROCEDURE — 69210 REMOVE IMPACTED EAR WAX UNI: CPT | Performed by: OTOLARYNGOLOGY

## 2024-06-11 ASSESSMENT — ENCOUNTER SYMPTOMS
GASTROINTESTINAL NEGATIVE: 1
RHINORRHEA: 0
RESPIRATORY NEGATIVE: 1
EYES NEGATIVE: 1
SINUS PRESSURE: 0

## 2024-06-11 NOTE — PROGRESS NOTES
impacted cerumen.      Left Ear: There is impacted cerumen.      Mouth/Throat:      Mouth: Mucous membranes are moist.      Pharynx: Oropharynx is clear.   Eyes:      Pupils: Pupils are equal, round, and reactive to light.   Cardiovascular:      Rate and Rhythm: Normal rate.      Pulses: Normal pulses.   Pulmonary:      Effort: Pulmonary effort is normal.   Abdominal:      General: Abdomen is flat.   Musculoskeletal:      Cervical back: Normal range of motion.   Neurological:      Mental Status: He is alert.              Cerumen removal     Auditorycanal(s) both ears completely obstructed with cerumen.Cerumen was gently removed using soft plastic curette, .Tympanic membranes are intact following the procedure. R Auditory canal appears normal. L EAC not inflammed     MRI IAC done in 1/29/24  INTERNAL AUDITORY CANALS: No mass or abnormal enhancement within the  cerebellopontine angle cisterns or internal auditory canals.  No abnormal  enhancement seen along of the facial or vestibulocochlear nerves. The inner  ear structures appear unremarkable. The middle ear cavities are clear. The  cisternal segments of the trigeminal nerves appear unremarkable.        Assessment:       Diagnosis Orders   1. Asymmetrical hearing loss        2. Dysfunction of left eustachian tube        3. Mixed hearing loss, bilateral        4. Bilateral impacted cerumen        5. Otitis externa of left ear, unspecified chronicity, unspecified type                   Plan:   Seen and examined for bilateral cerumen impaction and severe-to-profound sensorineural hearing loss across all frequencies. Will refer to  for consult for potential surgical options to help with hearing in the form of bone conduction hearing aid vs cochlear implant. Patient can discuss with otology at  what options he is a candidate for.    Follow up in 6 months cerumen removal    RX given today:  none       Les Romero  1955    I have discussed the case, including

## 2024-06-25 ENCOUNTER — HOSPITAL ENCOUNTER (OUTPATIENT)
Dept: RADIOLOGY | Facility: EXTERNAL LOCATION | Age: 69
Discharge: HOME | End: 2024-06-25

## 2024-08-18 ENCOUNTER — APPOINTMENT (OUTPATIENT)
Dept: GENERAL RADIOLOGY | Age: 69
End: 2024-08-18
Payer: COMMERCIAL

## 2024-08-18 ENCOUNTER — TELEPHONE (OUTPATIENT)
Dept: OTOLARYNGOLOGY | Facility: CLINIC | Age: 69
End: 2024-08-18
Payer: COMMERCIAL

## 2024-08-18 ENCOUNTER — APPOINTMENT (OUTPATIENT)
Dept: CT IMAGING | Age: 69
End: 2024-08-18
Payer: COMMERCIAL

## 2024-08-18 ENCOUNTER — HOSPITAL ENCOUNTER (INPATIENT)
Age: 69
LOS: 4 days | Discharge: HOME HEALTH | End: 2024-08-22
Attending: STUDENT IN AN ORGANIZED HEALTH CARE EDUCATION/TRAINING PROGRAM | Admitting: INTERNAL MEDICINE
Payer: COMMERCIAL

## 2024-08-18 DIAGNOSIS — R19.09 GROIN SWELLING: ICD-10-CM

## 2024-08-18 DIAGNOSIS — N49.2 SCROTAL ABSCESS: ICD-10-CM

## 2024-08-18 DIAGNOSIS — N45.3 EPIDIDYMOORCHITIS: Primary | ICD-10-CM

## 2024-08-18 DIAGNOSIS — E27.8 ADRENAL NODULE (HCC): ICD-10-CM

## 2024-08-18 DIAGNOSIS — R59.1 LYMPHADENOPATHY: ICD-10-CM

## 2024-08-18 LAB
ALBUMIN SERPL-MCNC: 3.7 G/DL (ref 3.5–5.2)
ALP SERPL-CCNC: 119 U/L (ref 40–129)
ALT SERPL-CCNC: 27 U/L (ref 0–40)
ANION GAP SERPL CALCULATED.3IONS-SCNC: 10 MMOL/L (ref 7–16)
AST SERPL-CCNC: 21 U/L (ref 0–39)
BASOPHILS # BLD: 0.09 K/UL (ref 0–0.2)
BASOPHILS NFR BLD: 1 % (ref 0–2)
BILIRUB SERPL-MCNC: 0.4 MG/DL (ref 0–1.2)
BUN SERPL-MCNC: 25 MG/DL (ref 6–23)
CALCIUM SERPL-MCNC: 9.9 MG/DL (ref 8.6–10.2)
CHLORIDE SERPL-SCNC: 106 MMOL/L (ref 98–107)
CO2 SERPL-SCNC: 20 MMOL/L (ref 22–29)
CREAT SERPL-MCNC: 1.3 MG/DL (ref 0.7–1.2)
CRP SERPL HS-MCNC: 180 MG/L (ref 0–5)
EOSINOPHIL # BLD: 0.62 K/UL (ref 0.05–0.5)
EOSINOPHILS RELATIVE PERCENT: 5 % (ref 0–6)
ERYTHROCYTE [DISTWIDTH] IN BLOOD BY AUTOMATED COUNT: 12.7 % (ref 11.5–15)
ERYTHROCYTE [SEDIMENTATION RATE] IN BLOOD BY WESTERGREN METHOD: 46 MM/HR (ref 0–15)
GFR, ESTIMATED: 59 ML/MIN/1.73M2
GLUCOSE SERPL-MCNC: 128 MG/DL (ref 74–99)
HCT VFR BLD AUTO: 42.6 % (ref 37–54)
HGB BLD-MCNC: 14.5 G/DL (ref 12.5–16.5)
IMM GRANULOCYTES # BLD AUTO: 0.14 K/UL (ref 0–0.58)
IMM GRANULOCYTES NFR BLD: 1 % (ref 0–5)
LACTATE BLDV-SCNC: 1.2 MMOL/L (ref 0.5–1.9)
LYMPHOCYTES NFR BLD: 1.29 K/UL (ref 1.5–4)
LYMPHOCYTES RELATIVE PERCENT: 9 % (ref 20–42)
MCH RBC QN AUTO: 31.8 PG (ref 26–35)
MCHC RBC AUTO-ENTMCNC: 34 G/DL (ref 32–34.5)
MCV RBC AUTO: 93.4 FL (ref 80–99.9)
MONOCYTES NFR BLD: 1.57 K/UL (ref 0.1–0.95)
MONOCYTES NFR BLD: 11 % (ref 2–12)
NEUTROPHILS NFR BLD: 73 % (ref 43–80)
NEUTS SEG NFR BLD: 10.17 K/UL (ref 1.8–7.3)
PLATELET # BLD AUTO: 238 K/UL (ref 130–450)
PMV BLD AUTO: 10.3 FL (ref 7–12)
POTASSIUM SERPL-SCNC: 4.4 MMOL/L (ref 3.5–5)
PROCALCITONIN SERPL-MCNC: 0.15 NG/ML (ref 0–0.08)
PROT SERPL-MCNC: 7.2 G/DL (ref 6.4–8.3)
RBC # BLD AUTO: 4.56 M/UL (ref 3.8–5.8)
RBC # BLD: ABNORMAL 10*6/UL
SODIUM SERPL-SCNC: 136 MMOL/L (ref 132–146)
WBC # BLD: ABNORMAL 10*3/UL
WBC OTHER # BLD: 13.9 K/UL (ref 4.5–11.5)

## 2024-08-18 PROCEDURE — 71045 X-RAY EXAM CHEST 1 VIEW: CPT

## 2024-08-18 PROCEDURE — 87040 BLOOD CULTURE FOR BACTERIA: CPT

## 2024-08-18 PROCEDURE — 6360000002 HC RX W HCPCS

## 2024-08-18 PROCEDURE — 1200000000 HC SEMI PRIVATE

## 2024-08-18 PROCEDURE — 84145 PROCALCITONIN (PCT): CPT

## 2024-08-18 PROCEDURE — 96361 HYDRATE IV INFUSION ADD-ON: CPT

## 2024-08-18 PROCEDURE — 6360000004 HC RX CONTRAST MEDICATION: Performed by: RADIOLOGY

## 2024-08-18 PROCEDURE — 99285 EMERGENCY DEPT VISIT HI MDM: CPT

## 2024-08-18 PROCEDURE — 86140 C-REACTIVE PROTEIN: CPT

## 2024-08-18 PROCEDURE — 85652 RBC SED RATE AUTOMATED: CPT

## 2024-08-18 PROCEDURE — 83605 ASSAY OF LACTIC ACID: CPT

## 2024-08-18 PROCEDURE — 80053 COMPREHEN METABOLIC PANEL: CPT

## 2024-08-18 PROCEDURE — 96375 TX/PRO/DX INJ NEW DRUG ADDON: CPT

## 2024-08-18 PROCEDURE — 74177 CT ABD & PELVIS W/CONTRAST: CPT

## 2024-08-18 PROCEDURE — 2580000003 HC RX 258

## 2024-08-18 PROCEDURE — 93005 ELECTROCARDIOGRAM TRACING: CPT

## 2024-08-18 PROCEDURE — 85025 COMPLETE CBC W/AUTO DIFF WBC: CPT

## 2024-08-18 PROCEDURE — 96365 THER/PROPH/DIAG IV INF INIT: CPT

## 2024-08-18 RX ORDER — IOPAMIDOL 755 MG/ML
75 INJECTION, SOLUTION INTRAVASCULAR
Status: COMPLETED | OUTPATIENT
Start: 2024-08-18 | End: 2024-08-18

## 2024-08-18 RX ORDER — 0.9 % SODIUM CHLORIDE 0.9 %
500 INTRAVENOUS SOLUTION INTRAVENOUS ONCE
Status: COMPLETED | OUTPATIENT
Start: 2024-08-18 | End: 2024-08-18

## 2024-08-18 RX ORDER — FENTANYL CITRATE 50 UG/ML
50 INJECTION, SOLUTION INTRAMUSCULAR; INTRAVENOUS ONCE
Status: COMPLETED | OUTPATIENT
Start: 2024-08-18 | End: 2024-08-18

## 2024-08-18 RX ADMIN — FENTANYL CITRATE 50 MCG: 50 INJECTION INTRAMUSCULAR; INTRAVENOUS at 21:57

## 2024-08-18 RX ADMIN — VANCOMYCIN HYDROCHLORIDE 2500 MG: 5 INJECTION, POWDER, LYOPHILIZED, FOR SOLUTION INTRAVENOUS at 19:22

## 2024-08-18 RX ADMIN — IOPAMIDOL 75 ML: 755 INJECTION, SOLUTION INTRAVENOUS at 18:48

## 2024-08-18 RX ADMIN — PIPERACILLIN AND TAZOBACTAM 3375 MG: 3; .375 INJECTION, POWDER, LYOPHILIZED, FOR SOLUTION INTRAVENOUS at 18:32

## 2024-08-18 RX ADMIN — SODIUM CHLORIDE 500 ML: 9 INJECTION, SOLUTION INTRAVENOUS at 17:07

## 2024-08-18 ASSESSMENT — PAIN DESCRIPTION - ORIENTATION: ORIENTATION: RIGHT

## 2024-08-18 ASSESSMENT — PAIN DESCRIPTION - LOCATION: LOCATION: SCROTUM

## 2024-08-18 ASSESSMENT — PAIN - FUNCTIONAL ASSESSMENT: PAIN_FUNCTIONAL_ASSESSMENT: 0-10

## 2024-08-18 ASSESSMENT — LIFESTYLE VARIABLES
HOW MANY STANDARD DRINKS CONTAINING ALCOHOL DO YOU HAVE ON A TYPICAL DAY: PATIENT DOES NOT DRINK
HOW OFTEN DO YOU HAVE A DRINK CONTAINING ALCOHOL: NEVER

## 2024-08-18 ASSESSMENT — PAIN SCALES - GENERAL
PAINLEVEL_OUTOF10: 5
PAINLEVEL_OUTOF10: 0

## 2024-08-18 NOTE — ED PROVIDER NOTES
[facility] MED SURG/TELE  EMERGENCY DEPARTMENT ENCOUNTER      Pt Name: Les Romero  MRN: [id number]  Birthdate 1955  Date of evaluation: 8/18/2024  Provider: Darryl Noonan MD  PCP: Les Mendez MD  Note Started: 4:34 PM EDT 8/18/24    CHIEF COMPLAINT       Chief Complaint   Patient presents with    Groin Swelling     Redness and swelling to testicle and scrotum, onset last Thursday. Denies fever/chills.        HISTORY OF PRESENT ILLNESS: 1 or more Elements   History From: Patient  Limitations to history : None    Les Romero is a 69 y.o. male who presents brought in by private vehicle from home with complaints of right groin pain, swelling and bloody drainage, onset past week and worsening since at least Thursday.  Patient reports that last week he noticed a small bump or pimple at the base of his scrotum, and did attempt to pop it.  Reports since Thursday he has had acute worsening of pain and redness with noticed bloody drainage today.  Reports associated localized swelling.  Denies history of diabetes or other skin infections.  Denies history of hidradenitis suppurativa.  Denies history of STD/STIs.  Patient is employed as a  however he reports he has not had any recent long drives or prolonged immobility.  Reports he is currently working up diagnosis of heart failure at [facility].  Currently denies nausea, vomiting, objective fevers, chills, chest pain or pressure, shortness of breath, dizziness, weakness, numbness, tingling in extremities, dysuria, diarrhea, constipation.    Nursing Notes were all reviewed and agreed with or any disagreements were addressed in the HPI.    REVIEW OF SYSTEMS :    Positives and Pertinent negatives as per HPI.     PAST MEDICAL HISTORY/Chronic Conditions Affecting Care    has a past medical history of Hypertension.     SURGICAL HISTORY     Past Surgical History:   Procedure Laterality Date    CARDIAC PROCEDURE N/A 3/1/2024    Left heart cath /  coronary angiography performed by Mikel Hennessy MD at [facility] CARDIAC CATH LAB    KIDNEY REMOVAL      SHOULDER ARTHROPLASTY Right        CURRENTMEDICATIONS       Current Discharge Medication List        CONTINUE these medications which have NOT CHANGED    Details   !! fluticasone (FLONASE) 50 MCG/ACT nasal spray 1 spray by Each Nostril route daily      loratadine (CLARITIN) 10 MG capsule Take 1 capsule by mouth daily      !! fluticasone (FLONASE) 50 MCG/ACT nasal spray 2 sprays by Each Nostril route daily  Qty: 16 g, Refills: 5      carvedilol (COREG) 12.5 MG tablet Take 1 tablet by mouth 2 times daily  Qty: 60 tablet, Refills: 5      famotidine (PEPCID) 20 MG tablet Take 1 tablet by mouth 2 times daily    Associated Diagnoses: NICM (nonischemic cardiomyopathy) ([facility])      empagliflozin (JARDIANCE) 10 MG tablet Take 1 tablet by mouth daily  Qty: 30 tablet, Refills: 5    Associated Diagnoses: NICM (nonischemic cardiomyopathy) ([facility])      spironolactone (ALDACTONE) 25 MG tablet Take 1 tablet by mouth daily  Qty: 30 tablet, Refills: 5      allopurinol (ZYLOPRIM) 300 MG tablet Take 1 tablet by mouth daily    Associated Diagnoses: Chest pain, unspecified type      aspirin 81 MG EC tablet Take 1 tablet by mouth daily    Associated Diagnoses: Chest pain, unspecified type      torsemide (DEMADEX) 10 MG tablet Take 1 tablet by mouth daily  Qty: 30 tablet, Refills: 5      sacubitril-valsartan (ENTRESTO) 49-51 MG per tablet Take 1 tablet by mouth 2 times daily  Qty: 60 tablet, Refills: 5       !! - Potential duplicate medications found. Please discuss with provider.          ALLERGIES     Ace inhibitors    FAMILYHISTORY       Family History   Problem Relation Age of Onset    Heart Attack Mother 72    Heart Attack Father 65        SOCIAL HISTORY       Social History     Tobacco Use    Smoking status: Former     Types: Cigars     Quit date: 2024     Years since quittin.5    Smokeless tobacco: Never   Vaping Use    Vaping  MD Saran (electronically signed)

## 2024-08-18 NOTE — TELEPHONE ENCOUNTER
Spoke with patient's wife regarding appointments scheduled for 8/19. The wife stated that they will go to the cochlear implant evaluation appointment and understand that appointment with Dr. Valles is cancelled. They understand that they will be contacted to reschedule with Dr. Valles. Mila Coffey will be contacted.

## 2024-08-19 ENCOUNTER — APPOINTMENT (OUTPATIENT)
Dept: OTOLARYNGOLOGY | Facility: CLINIC | Age: 69
End: 2024-08-19
Payer: COMMERCIAL

## 2024-08-19 ENCOUNTER — APPOINTMENT (OUTPATIENT)
Dept: AUDIOLOGY | Facility: CLINIC | Age: 69
End: 2024-08-19
Payer: COMMERCIAL

## 2024-08-19 LAB
ALBUMIN SERPL-MCNC: 3.1 G/DL (ref 3.5–5.2)
ALP SERPL-CCNC: 85 U/L (ref 40–129)
ALT SERPL-CCNC: 26 U/L (ref 0–40)
ANION GAP SERPL CALCULATED.3IONS-SCNC: 9 MMOL/L (ref 7–16)
AST SERPL-CCNC: 16 U/L (ref 0–39)
ATYPICAL LYMPHOCYTE ABSOLUTE COUNT: 0.28 K/UL (ref 0–0.46)
ATYPICAL LYMPHOCYTES: 2 % (ref 0–4)
BASOPHILS # BLD: 0 K/UL (ref 0–0.2)
BASOPHILS NFR BLD: 0 % (ref 0–2)
BILIRUB SERPL-MCNC: 0.4 MG/DL (ref 0–1.2)
BUN SERPL-MCNC: 21 MG/DL (ref 6–23)
CALCIUM SERPL-MCNC: 9.1 MG/DL (ref 8.6–10.2)
CHLORIDE SERPL-SCNC: 107 MMOL/L (ref 98–107)
CO2 SERPL-SCNC: 20 MMOL/L (ref 22–29)
CREAT SERPL-MCNC: 1.3 MG/DL (ref 0.7–1.2)
EKG ATRIAL RATE: 80 BPM
EKG P AXIS: 46 DEGREES
EKG P-R INTERVAL: 192 MS
EKG Q-T INTERVAL: 386 MS
EKG QRS DURATION: 132 MS
EKG QTC CALCULATION (BAZETT): 445 MS
EKG R AXIS: -32 DEGREES
EKG T AXIS: 10 DEGREES
EKG VENTRICULAR RATE: 80 BPM
EOSINOPHIL # BLD: 0.83 K/UL (ref 0.05–0.5)
EOSINOPHILS RELATIVE PERCENT: 5 % (ref 0–6)
ERYTHROCYTE [DISTWIDTH] IN BLOOD BY AUTOMATED COUNT: 12.6 % (ref 11.5–15)
GFR, ESTIMATED: 62 ML/MIN/1.73M2
GLUCOSE BLD-MCNC: 106 MG/DL (ref 74–99)
GLUCOSE BLD-MCNC: 123 MG/DL (ref 74–99)
GLUCOSE BLD-MCNC: 161 MG/DL (ref 74–99)
GLUCOSE BLD-MCNC: 81 MG/DL (ref 74–99)
GLUCOSE BLD-MCNC: 89 MG/DL (ref 74–99)
GLUCOSE SERPL-MCNC: 99 MG/DL (ref 74–99)
HCT VFR BLD AUTO: 39.8 % (ref 37–54)
HGB BLD-MCNC: 13.4 G/DL (ref 12.5–16.5)
LYMPHOCYTES NFR BLD: 1.11 K/UL (ref 1.5–4)
LYMPHOCYTES RELATIVE PERCENT: 7 % (ref 20–42)
MCH RBC QN AUTO: 31.9 PG (ref 26–35)
MCHC RBC AUTO-ENTMCNC: 33.7 G/DL (ref 32–34.5)
MCV RBC AUTO: 94.8 FL (ref 80–99.9)
METAMYELOCYTES ABSOLUTE COUNT: 0.14 K/UL (ref 0–0.12)
METAMYELOCYTES: 1 % (ref 0–1)
MONOCYTES NFR BLD: 0.83 K/UL (ref 0.1–0.95)
MONOCYTES NFR BLD: 5 % (ref 2–12)
NEUTROPHILS NFR BLD: 80 % (ref 43–80)
NEUTS SEG NFR BLD: 12.61 K/UL (ref 1.8–7.3)
PLATELET # BLD AUTO: 236 K/UL (ref 130–450)
PMV BLD AUTO: 10.2 FL (ref 7–12)
POTASSIUM SERPL-SCNC: 4.5 MMOL/L (ref 3.5–5)
PROT SERPL-MCNC: 6.3 G/DL (ref 6.4–8.3)
RBC # BLD AUTO: 4.2 M/UL (ref 3.8–5.8)
RBC # BLD: ABNORMAL 10*6/UL
SODIUM SERPL-SCNC: 136 MMOL/L (ref 132–146)
WBC OTHER # BLD: 15.8 K/UL (ref 4.5–11.5)

## 2024-08-19 PROCEDURE — 87205 SMEAR GRAM STAIN: CPT

## 2024-08-19 PROCEDURE — 87070 CULTURE OTHR SPECIMN AEROBIC: CPT

## 2024-08-19 PROCEDURE — 85025 COMPLETE CBC W/AUTO DIFF WBC: CPT

## 2024-08-19 PROCEDURE — 93010 ELECTROCARDIOGRAM REPORT: CPT | Performed by: INTERNAL MEDICINE

## 2024-08-19 PROCEDURE — 82962 GLUCOSE BLOOD TEST: CPT

## 2024-08-19 PROCEDURE — 36415 COLL VENOUS BLD VENIPUNCTURE: CPT

## 2024-08-19 PROCEDURE — 6370000000 HC RX 637 (ALT 250 FOR IP): Performed by: INTERNAL MEDICINE

## 2024-08-19 PROCEDURE — 2580000003 HC RX 258: Performed by: SPECIALIST

## 2024-08-19 PROCEDURE — 87181 SC STD AGAR DILUTION PER AGT: CPT

## 2024-08-19 PROCEDURE — 86403 PARTICLE AGGLUT ANTBDY SCRN: CPT

## 2024-08-19 PROCEDURE — 80053 COMPREHEN METABOLIC PANEL: CPT

## 2024-08-19 PROCEDURE — 1200000000 HC SEMI PRIVATE

## 2024-08-19 PROCEDURE — 6360000002 HC RX W HCPCS: Performed by: SPECIALIST

## 2024-08-19 PROCEDURE — 87077 CULTURE AEROBIC IDENTIFY: CPT

## 2024-08-19 PROCEDURE — 6370000000 HC RX 637 (ALT 250 FOR IP): Performed by: SPECIALIST

## 2024-08-19 PROCEDURE — 6360000002 HC RX W HCPCS: Performed by: INTERNAL MEDICINE

## 2024-08-19 RX ORDER — SPIRONOLACTONE 25 MG/1
25 TABLET ORAL DAILY
Status: DISCONTINUED | OUTPATIENT
Start: 2024-08-19 | End: 2024-08-22 | Stop reason: HOSPADM

## 2024-08-19 RX ORDER — MICONAZOLE NITRATE 20 MG/G
CREAM TOPICAL 2 TIMES DAILY
Status: DISCONTINUED | OUTPATIENT
Start: 2024-08-19 | End: 2024-08-22 | Stop reason: HOSPADM

## 2024-08-19 RX ORDER — INSULIN LISPRO 100 [IU]/ML
0-4 INJECTION, SOLUTION INTRAVENOUS; SUBCUTANEOUS
Status: DISCONTINUED | OUTPATIENT
Start: 2024-08-19 | End: 2024-08-20

## 2024-08-19 RX ORDER — ALLOPURINOL 300 MG/1
300 TABLET ORAL DAILY
Status: DISCONTINUED | OUTPATIENT
Start: 2024-08-19 | End: 2024-08-22 | Stop reason: HOSPADM

## 2024-08-19 RX ORDER — CARVEDILOL 6.25 MG/1
12.5 TABLET ORAL 2 TIMES DAILY WITH MEALS
Status: DISCONTINUED | OUTPATIENT
Start: 2024-08-19 | End: 2024-08-22 | Stop reason: HOSPADM

## 2024-08-19 RX ORDER — ENOXAPARIN SODIUM 100 MG/ML
40 INJECTION SUBCUTANEOUS DAILY
Status: DISCONTINUED | OUTPATIENT
Start: 2024-08-19 | End: 2024-08-22 | Stop reason: HOSPADM

## 2024-08-19 RX ORDER — LINEZOLID 600 MG/1
600 TABLET, FILM COATED ORAL EVERY 12 HOURS SCHEDULED
Status: DISCONTINUED | OUTPATIENT
Start: 2024-08-19 | End: 2024-08-22

## 2024-08-19 RX ORDER — GLUCAGON 1 MG/ML
1 KIT INJECTION PRN
Status: DISCONTINUED | OUTPATIENT
Start: 2024-08-19 | End: 2024-08-22 | Stop reason: HOSPADM

## 2024-08-19 RX ORDER — DEXTROSE MONOHYDRATE 100 MG/ML
INJECTION, SOLUTION INTRAVENOUS CONTINUOUS PRN
Status: DISCONTINUED | OUTPATIENT
Start: 2024-08-19 | End: 2024-08-22 | Stop reason: HOSPADM

## 2024-08-19 RX ORDER — ACETAMINOPHEN 500 MG
500 TABLET ORAL EVERY 6 HOURS PRN
Status: DISCONTINUED | OUTPATIENT
Start: 2024-08-19 | End: 2024-08-22 | Stop reason: HOSPADM

## 2024-08-19 RX ORDER — PANTOPRAZOLE SODIUM 40 MG/1
40 TABLET, DELAYED RELEASE ORAL
Status: DISCONTINUED | OUTPATIENT
Start: 2024-08-19 | End: 2024-08-22 | Stop reason: HOSPADM

## 2024-08-19 RX ORDER — INSULIN LISPRO 100 [IU]/ML
0-4 INJECTION, SOLUTION INTRAVENOUS; SUBCUTANEOUS
Status: DISCONTINUED | OUTPATIENT
Start: 2024-08-19 | End: 2024-08-19

## 2024-08-19 RX ADMIN — PANTOPRAZOLE SODIUM 40 MG: 40 TABLET, DELAYED RELEASE ORAL at 05:56

## 2024-08-19 RX ADMIN — LINEZOLID 600 MG: 600 TABLET, FILM COATED ORAL at 20:00

## 2024-08-19 RX ADMIN — ALLOPURINOL 300 MG: 300 TABLET ORAL at 09:13

## 2024-08-19 RX ADMIN — SPIRONOLACTONE 25 MG: 25 TABLET ORAL at 09:14

## 2024-08-19 RX ADMIN — ENOXAPARIN SODIUM 40 MG: 100 INJECTION SUBCUTANEOUS at 09:14

## 2024-08-19 RX ADMIN — AMPICILLIN SODIUM AND SULBACTAM SODIUM 3000 MG: 2; 1 INJECTION, POWDER, FOR SOLUTION INTRAMUSCULAR; INTRAVENOUS at 17:40

## 2024-08-19 RX ADMIN — MICONAZOLE NITRATE: 20 CREAM TOPICAL at 18:01

## 2024-08-19 RX ADMIN — SACUBITRIL AND VALSARTAN 1 TABLET: 49; 51 TABLET, FILM COATED ORAL at 19:54

## 2024-08-19 RX ADMIN — CARVEDILOL 12.5 MG: 6.25 TABLET, FILM COATED ORAL at 17:35

## 2024-08-19 RX ADMIN — AMPICILLIN SODIUM AND SULBACTAM SODIUM 3000 MG: 2; 1 INJECTION, POWDER, FOR SOLUTION INTRAMUSCULAR; INTRAVENOUS at 23:32

## 2024-08-19 RX ADMIN — CARVEDILOL 12.5 MG: 6.25 TABLET, FILM COATED ORAL at 09:13

## 2024-08-19 RX ADMIN — SACUBITRIL AND VALSARTAN 1 TABLET: 49; 51 TABLET, FILM COATED ORAL at 09:14

## 2024-08-19 RX ADMIN — AMPICILLIN SODIUM AND SULBACTAM SODIUM 3000 MG: 2; 1 INJECTION, POWDER, FOR SOLUTION INTRAMUSCULAR; INTRAVENOUS at 12:16

## 2024-08-19 RX ADMIN — ACETAMINOPHEN 500 MG: 500 TABLET ORAL at 19:54

## 2024-08-19 RX ADMIN — ACETAMINOPHEN 500 MG: 500 TABLET ORAL at 04:53

## 2024-08-19 RX ADMIN — MICONAZOLE NITRATE: 20 CREAM TOPICAL at 22:22

## 2024-08-19 ASSESSMENT — PAIN SCALES - GENERAL
PAINLEVEL_OUTOF10: 1
PAINLEVEL_OUTOF10: 3
PAINLEVEL_OUTOF10: 2

## 2024-08-19 ASSESSMENT — PAIN DESCRIPTION - LOCATION: LOCATION: GROIN;SCROTUM

## 2024-08-19 ASSESSMENT — PAIN DESCRIPTION - DESCRIPTORS: DESCRIPTORS: BURNING

## 2024-08-19 ASSESSMENT — PAIN DESCRIPTION - ORIENTATION: ORIENTATION: MID

## 2024-08-19 NOTE — PROGRESS NOTES
4 Eyes Skin Assessment     NAME:  Les Romero  YOB: 1955  MEDICAL RECORD NUMBER:  [id number]    The patient is being assessed for  Admission    I agree that at least one RN has performed a thorough Head to Toe Skin Assessment on the patient. ALL assessment sites listed below have been assessed.      Areas assessed by both nurses:    Head, Face, Ears, Shoulders, Back, Chest, Arms, Elbows, Hands, Sacrum. Buttock, Coccyx, Ischium, Legs. Feet and Heels, and Under Medical Devices         Does the Patient have a Wound? Yes wound(s) were present on assessment. LDA wound assessment was Initiated and completed by RN    Perineum ~ Raised, hardened area with serosanguenous drainage; open area measuring 1.2 cm x 1.2 cm  L and R Inner Groin ~ rash-like redness       Iban Prevention initiated by RN: No  Wound Care Orders initiated by RN: Yes    Pressure Injury (Stage 3,4, Unstageable, DTI, NWPT, and Complex wounds) if present, place Wound referral order by RN under : No    New Ostomies, if present place, Ostomy referral order under : No     Nurse 1 eSignature: Electronically signed by Lucy Herrera RN on 8/18/24 at 11:00 PM EDT    **SHARE this note so that the co-signing nurse can place an eSignature**    Nurse 2 eSignature: Electronically signed by Kim Vallejo RN on 8/18/24 at 11:15 PM EDT

## 2024-08-19 NOTE — CONSULTS
[facility]  [facility]  Consultation Note     Admit Date: 2024  4:29 PM    Reason for Consult:   Epididymoorchitis    Attending Physician:  Les Mendez MD    HISTORY OF PRESENT ILLNESS:             The history is obtained from extensive review of available past medical records. The patient is a 69 y.o. male who is unknown to the ID service.  The patient presented to the ED at [facility] on 2024 with right groin pain, swelling and bloody drainage that began a week ago.  He had noticed a small pimple at the base of the scrotum that he attempted to squeeze. On presentation he was slightly hypotensive but afebrile.  White count was 13.9.  CMP showed a creatinine of 1.3 with a BUN of 25.  CRP was 180 and ESR was 46.  CT of the abdomen and pelvis showed right inguinal inflammatory changes and right epididymal orchitis.  He was treated with 1 dose of Vancomycin and Zosyn.    Past Medical History:        Diagnosis Date    Hypertension      Past Surgical History:        Procedure Laterality Date    CARDIAC PROCEDURE N/A 3/1/2024    Left heart cath / coronary angiography performed by Mikel Hennessy [place] [facility] [place] [facility]    KIDNEY REMOVAL      SHOULDER ARTHROPLASTY Right      Current Medications:   Scheduled Meds:   enoxaparin  40 mg SubCUTAneous Daily    carvedilol  12.5 mg Oral BID WC    sacubitril-valsartan  1 tablet Oral BID    spironolactone  25 mg Oral Daily    allopurinol  300 mg Oral Daily    pantoprazole  40 mg Oral QAM AC    insulin lispro  0-4 Units SubCUTAneous 4x Daily AC & HS     Continuous Infusions:   dextrose       PRN Meds:glucose, dextrose bolus **OR** dextrose bolus, glucagon (rDNA), dextrose, acetaminophen    Allergies:  Ace inhibitors    Social History:   Social History     Socioeconomic History    Marital status:    Tobacco Use    Smoking status: Former     Types: Cigars     Quit date: 2024     Years since quittin.5    Smokeless

## 2024-08-19 NOTE — CARE COORDINATION
8/19/2024.Social Work Discharge Planning: HF education. Urology and ID are following.[facility]. Room air. This worker met with Pt to discuss  role and transition of care/discharge planning.  Pt is independent from home with his spouse and uses no DME.  Pharmacy is [facility] in [place] and PCP is Dr. Mendez. Pt has no history with [facility]. Spouse will transport at discharge. Electronically signed by CLEM Price on 8/19/2024 at 12:35 PM

## 2024-08-19 NOTE — PLAN OF CARE
Problem: Discharge Planning  Goal: Discharge to home or other facility with appropriate resources  Outcome: Progressing  Flowsheets (Taken 8/19/2024 0233)  Discharge to home or other facility with appropriate resources:   Identify barriers to discharge with patient and caregiver   Arrange for needed discharge resources and transportation as appropriate   Identify discharge learning needs (meds, wound care, etc)   Refer to discharge planning if patient needs post-hospital services based on physician order or complex needs related to functional status, cognitive ability or social support system     Problem: Pain  Goal: Verbalizes/displays adequate comfort level or baseline comfort level  Outcome: Progressing

## 2024-08-19 NOTE — ED NOTES
ED to Inpatient Handoff Report    Notified Mary Ann RN that electronic handoff available and patient ready for transport to room 0547.    Safety Risks: Risk of falls    Patient in Restraints: no    Constant Observer or Patient : no    Telemetry Monitoring Ordered :No           Order to transfer to unit without monitor:N/A    Last MEWS: 1 Time completed: 2158    Deterioration Index Score:   Predictive Model Details          25 (Normal)  Factor Value    Calculated 8/18/2024 22:00 48% Age 69 years old    Deterioration Index Model 13% WBC count abnormal (13.9 k/uL)     10% Systolic 150     10% Potassium 4.4 mmol/L     10% Respiratory rate 18     4% BUN abnormal (25 mg/dL)     2% Sodium 136 mmol/L     2% Pulse oximetry 99 %     1% Pulse 85     0% Temperature 97.9 °F (36.6 °C)     0% Hematocrit 42.6 %        Vitals:    08/18/24 1847 08/18/24 1902 08/18/24 1909 08/18/24 2158   BP:  121/81 125/77 (!) 150/89   Pulse: 74 85 82 85   Resp: 11 12 16 18   Temp:    97.9 °F (36.6 °C)   TempSrc:    Oral   SpO2: 95% 99% 98% 99%   Weight:       Height:             Opportunity for questions and clarification was provided.

## 2024-08-19 NOTE — H&P
[place] [address]                           HISTORY & PHYSICAL      PATIENT NAME: LES ARECHIGA               : 1955  MED REC NO: [id number]                        ROOM: [facility]  ACCOUNT NO: [id number]                       ADMIT DATE: 2024  PROVIDER: Les Mendez MD      CHIEF COMPLAINT:  Swelling and drainage in the scrotum.    HISTORY OF PRESENT ILLNESS:  This is a 69-year-old gentleman who has noted a swelling in his scrotum for the last several days.  He presented to the office at the end of the week and at that time was noted to have a 3 x 4 cm firmness in the base of his scrotum.  At that time, it was not reddened, was minimally tender.  He did not have any fever or chills or any purulent drainage from this.  He was instructed to use some warm soaks and was referred to Urology.  Subsequent to that, the patient developed some increasing redness and did begin to have some bloody and purulent drainage from this area.  The patient immediately came to the emergency room and has now been admitted for this infection.  A CAT scan suggests the evidence of a right-sided epididymal orchitis.  Urology and Infectious Disease have been consulted.  He did have mild elevation in his white count with no evidence of any fever, chills, or elevation of lactic acid.  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  The patient was instructed to discontinue his Jardiance and not use it in the future in the setting of this genitourinary infection.  The patient denies any chest pain, palpitations, or worsening shortness of breath.  He denies any increased leg swelling, orthopnea, or PND.  As stated, he has no fever or chills.  At this time, his pain is adequately controlled with Tylenol.  He was given a dose of Zosyn and vancomycin in the emergency room.    PAST MEDICAL  Attempts made to contact patient in regards to appointment that was scheduled for today with this writer at 1:30 pm via video visit.  Patient did not link onto video visit nor answer the phone with attempts made, therefore left patient a detailed message including call center contact information to call and reschedule this appointment at her earliest convenience.

## 2024-08-19 NOTE — PLAN OF CARE
Patient's chart updated to reflect:      .    - HF care plan, HF education points and HF discharge instructions.  -Orders: 2 gram sodium diet, daily weights, I/O.  -PCP and/or Cardiologist appointment to be scheduled within 7 days of hospital discharge.  -History of HF, not primary admission Dx.  Patient admitted for treatment of Epididymo-orchitis.    Grace Gonzalez RN BSN  Heart Failure Navigator

## 2024-08-19 NOTE — DISCHARGE INSTRUCTIONS
HEART FAILURE  / CONGESTIVE HEART FAILURE  DISCHARGE INSTRUCTIONS:  GUIDELINES TO FOLLOW AT HOME    Self- Managed Care:     MEDICATIONS:  Take your medication as directed. If you are experiencing any side effects, inform your doctor, Do not stop taking any of your medications without letting your doctor know.   Check with your doctor before taking any over-the-counter medications / herbal / or dietary supplements. They may interfere with your other medications.  Do not take ibuprofen (Advil or Motrin) and naproxen (Aleve) without talking to your doctor first. They could make your heart failure worse.         WEIGHT MONITORING:   Weigh yourself everyday (with the same scale) around the same time of the day and write it down. (you can chart them on a calendar or keep track of them on paper.   Notify your doctor of a weight gain of 3 pounds or more in 1 day   OR a total of 5 pounds or more in 1 week    Take your weight record to your doctor visits  Also, the same goes if you loose more than 3# in one day, let your heart doctor know.         DIET:   Cardiac heart healthy diet- Low saturated / low trans fat, no added salt, caffeine restricted, Low sodium diet-   No more than 2,000mg (2 grams) of salt / sodium per day (which equals to a little less than  a teaspoon of salt)  If your doctor wants you on a fluid restriction...it is usually recommended a fluid limit of 2,000cc -  Fluid restriction- 2,000 ml (milliliters) = 64 ounces = you can have 8 glasses of fluid per day (each glass 8 ounces)    Follow a low salt diet - avoid using salt at the table, avoid / limit use of canned soups, processed / packaged foods, salted snacks, olives and pickles.  Do not use a salt substitute without checking with your doctor, they may contain a high amount of potassioum. (Mrs. Dash is safe to use).    Limit the use of alcohol       CALL YOUR DOCTOR THE FIRST DAY YOU NOTICE ANY OF THESE   SYMPTOMS:  You have a  Note faxed to school number provided by father. Advised parent.

## 2024-08-19 NOTE — PLAN OF CARE
Problem: Discharge Planning  Goal: Discharge to home or other facility with appropriate resources  8/19/2024 1102 by Justin Cohen RN  Outcome: Progressing  8/19/2024 0415 by Mary Ann Whipple RN  Outcome: Progressing  Flowsheets (Taken 8/19/2024 0233)  Discharge to home or other facility with appropriate resources:   Identify barriers to discharge with patient and caregiver   Arrange for needed discharge resources and transportation as appropriate   Identify discharge learning needs (meds, wound care, etc)   Refer to discharge planning if patient needs post-hospital services based on physician order or complex needs related to functional status, cognitive ability or social support system     Problem: Pain  Goal: Verbalizes/displays adequate comfort level or baseline comfort level  8/19/2024 1102 by Justin Cohen RN  Outcome: Progressing  8/19/2024 0415 by Mary Ann Whipple RN  Outcome: Progressing     Problem: Chronic Conditions and Co-morbidities  Goal: Patient's chronic conditions and co-morbidity symptoms are monitored and maintained or improved  Outcome: Progressing

## 2024-08-19 NOTE — CONSULTS
CONTRAST 8/18/2024 6:47 pm     TECHNIQUE:  CT of the abdomen and pelvis was performed with the administration of  intravenous contrast. Multiplanar reformatted images are provided for review.  Automated exposure control, iterative reconstruction, and/or weight based  adjustment of the mA/kV was utilized to reduce the radiation dose to as low  as reasonably achievable.     COMPARISON:  None     HISTORY:  ORDERING SYSTEM PROVIDED HISTORY: Testicular abscess and induration  TECHNOLOGIST PROVIDED HISTORY:  Go below groin  Additional Contrast?->None  Reason for exam:->Testicular abscess and induration  Decision Support Exception - unselect if not a suspected or confirmed  emergency medical condition->Emergency Medical Condition (MA)     FINDINGS:  Lower Chest: Lung bases demonstrate no active disease.     Organs: Liver demonstrates fatty infiltration, but no focal disease.  Gallbladder, pancreas and spleen, adrenals, right kidney, aorta and IVC  appear stable.  Status post left nephrectomy.  Left adrenal demonstrates mild  nodularity with a suggestion of a nodule measuring 1.6 cm with mean  Hounsfield units of 79.     GI/Bowel: No bowel obstruction or perforation.  Normal appendix.  Mild  diverticulosis, but no acute diverticulitis.     Pelvis: Urinary bladder and prostate appear stable.  Mild fat containing  inguinal hernias.  Extensive inflammatory changes originating in the inguinal  canal and extending into the right testicle which demonstrates hydrocele and  scrotal edema.  Reactive lymph nodes in the right groin measure approximately  1.3 cm.  No significant iliac lymphadenopathy.  Left groin nodes measure up  to 1.2 cm.     Peritoneum/Retroperitoneum: No retroperitoneal lymphadenopathy or acute  mesenteric findings.  No significant hernia.     Bones/Soft Tissues: Mild anterolisthesis of L5 over S1 with bilateral L5  spondylolysis.  Lumbar spine and sacrum appear unremarkable.     IMPRESSION:  1. Extensive  inflammatory changes originating in the right inguinal canal and  extending into the right testicle which demonstrates hydrocele and scrotal  edema. Findings are consistent with right-sided epididymo-orchitis.  2. Reactive lymph nodes in the right groin measure approximately 1.3 cm.  3. Left adrenal demonstrates mild nodularity with a suggestion of a nodule  measuring 1.6 cm with mean Hounsfield units of 79.  4. Status post left nephrectomy.  5. Fatty infiltration of the liver.  6. Mild anterolisthesis of L5 over S1 with bilateral L5 spondylolysis.     RECOMMENDATIONS:  Pathology: Left adrenal mass measuring 1.6 cm, probable benign adenoma.      Assessment/plan:  Scrotal/perineal/possible right groin abscess.  Continue observe overnight for drainage as the perineal wound is now open.  Continue IV antibiotics.  Cultures pending.  Infectious disease on case.  Reevaluate in the morning.  N.p.o. after midnight.  If no improvement he will be taken the operating room for incision and drainage tomorrow.    Electronically signed by VJ Amin CNP on 8/19/2024 at 12:45 PM  [facility]

## 2024-08-20 ENCOUNTER — ANESTHESIA (OUTPATIENT)
Dept: OPERATING ROOM | Age: 69
End: 2024-08-20
Payer: COMMERCIAL

## 2024-08-20 ENCOUNTER — ANESTHESIA EVENT (OUTPATIENT)
Dept: OPERATING ROOM | Age: 69
End: 2024-08-20
Payer: COMMERCIAL

## 2024-08-20 LAB
ALBUMIN SERPL-MCNC: 3.1 G/DL (ref 3.5–5.2)
ALP SERPL-CCNC: 93 U/L (ref 40–129)
ALT SERPL-CCNC: 28 U/L (ref 0–40)
ANION GAP SERPL CALCULATED.3IONS-SCNC: 10 MMOL/L (ref 7–16)
AST SERPL-CCNC: 23 U/L (ref 0–39)
BASOPHILS # BLD: 0.09 K/UL (ref 0–0.2)
BASOPHILS NFR BLD: 1 % (ref 0–2)
BILIRUB SERPL-MCNC: 0.3 MG/DL (ref 0–1.2)
BUN SERPL-MCNC: 18 MG/DL (ref 6–23)
CALCIUM SERPL-MCNC: 9 MG/DL (ref 8.6–10.2)
CHLORIDE SERPL-SCNC: 107 MMOL/L (ref 98–107)
CO2 SERPL-SCNC: 21 MMOL/L (ref 22–29)
CREAT SERPL-MCNC: 1 MG/DL (ref 0.7–1.2)
EOSINOPHIL # BLD: 0.62 K/UL (ref 0.05–0.5)
EOSINOPHILS RELATIVE PERCENT: 6 % (ref 0–6)
ERYTHROCYTE [DISTWIDTH] IN BLOOD BY AUTOMATED COUNT: 12.6 % (ref 11.5–15)
GFR, ESTIMATED: 78 ML/MIN/1.73M2
GLUCOSE BLD-MCNC: 108 MG/DL (ref 74–99)
GLUCOSE SERPL-MCNC: 99 MG/DL (ref 74–99)
HCT VFR BLD AUTO: 41 % (ref 37–54)
HGB BLD-MCNC: 13.7 G/DL (ref 12.5–16.5)
IMM GRANULOCYTES # BLD AUTO: 0.22 K/UL (ref 0–0.58)
IMM GRANULOCYTES NFR BLD: 2 % (ref 0–5)
LYMPHOCYTES NFR BLD: 1.73 K/UL (ref 1.5–4)
LYMPHOCYTES RELATIVE PERCENT: 17 % (ref 20–42)
MCH RBC QN AUTO: 31.4 PG (ref 26–35)
MCHC RBC AUTO-ENTMCNC: 33.4 G/DL (ref 32–34.5)
MCV RBC AUTO: 93.8 FL (ref 80–99.9)
MONOCYTES NFR BLD: 1.15 K/UL (ref 0.1–0.95)
MONOCYTES NFR BLD: 11 % (ref 2–12)
NEUTROPHILS NFR BLD: 64 % (ref 43–80)
NEUTS SEG NFR BLD: 6.67 K/UL (ref 1.8–7.3)
PLATELET # BLD AUTO: 258 K/UL (ref 130–450)
PMV BLD AUTO: 10 FL (ref 7–12)
POTASSIUM SERPL-SCNC: 4.3 MMOL/L (ref 3.5–5)
PROT SERPL-MCNC: 6.5 G/DL (ref 6.4–8.3)
RBC # BLD AUTO: 4.37 M/UL (ref 3.8–5.8)
SODIUM SERPL-SCNC: 138 MMOL/L (ref 132–146)
WBC OTHER # BLD: 10.5 K/UL (ref 4.5–11.5)

## 2024-08-20 PROCEDURE — 2580000003 HC RX 258: Performed by: NURSE ANESTHETIST, CERTIFIED REGISTERED

## 2024-08-20 PROCEDURE — 6360000002 HC RX W HCPCS: Performed by: NURSE ANESTHETIST, CERTIFIED REGISTERED

## 2024-08-20 PROCEDURE — 3600000012 HC SURGERY LEVEL 2 ADDTL 15MIN: Performed by: UROLOGY

## 2024-08-20 PROCEDURE — 2500000003 HC RX 250 WO HCPCS: Performed by: UROLOGY

## 2024-08-20 PROCEDURE — 2709999900 HC NON-CHARGEABLE SUPPLY: Performed by: UROLOGY

## 2024-08-20 PROCEDURE — 87205 SMEAR GRAM STAIN: CPT

## 2024-08-20 PROCEDURE — 80053 COMPREHEN METABOLIC PANEL: CPT

## 2024-08-20 PROCEDURE — 3700000001 HC ADD 15 MINUTES (ANESTHESIA): Performed by: UROLOGY

## 2024-08-20 PROCEDURE — 3700000000 HC ANESTHESIA ATTENDED CARE: Performed by: UROLOGY

## 2024-08-20 PROCEDURE — 87075 CULTR BACTERIA EXCEPT BLOOD: CPT

## 2024-08-20 PROCEDURE — 87176 TISSUE HOMOGENIZATION CULTR: CPT

## 2024-08-20 PROCEDURE — 2500000003 HC RX 250 WO HCPCS: Performed by: ANESTHESIOLOGY

## 2024-08-20 PROCEDURE — 6360000002 HC RX W HCPCS: Performed by: ANESTHESIOLOGY

## 2024-08-20 PROCEDURE — 6370000000 HC RX 637 (ALT 250 FOR IP): Performed by: NURSE PRACTITIONER

## 2024-08-20 PROCEDURE — 6360000002 HC RX W HCPCS: Performed by: SPECIALIST

## 2024-08-20 PROCEDURE — 82962 GLUCOSE BLOOD TEST: CPT

## 2024-08-20 PROCEDURE — 6370000000 HC RX 637 (ALT 250 FOR IP): Performed by: ANESTHESIOLOGY

## 2024-08-20 PROCEDURE — 86403 PARTICLE AGGLUT ANTBDY SCRN: CPT

## 2024-08-20 PROCEDURE — 87116 MYCOBACTERIA CULTURE: CPT

## 2024-08-20 PROCEDURE — 36415 COLL VENOUS BLD VENIPUNCTURE: CPT

## 2024-08-20 PROCEDURE — 87015 SPECIMEN INFECT AGNT CONCNTJ: CPT

## 2024-08-20 PROCEDURE — 1200000000 HC SEMI PRIVATE

## 2024-08-20 PROCEDURE — 87102 FUNGUS ISOLATION CULTURE: CPT

## 2024-08-20 PROCEDURE — 2580000003 HC RX 258: Performed by: ANESTHESIOLOGY

## 2024-08-20 PROCEDURE — 3600000002 HC SURGERY LEVEL 2 BASE: Performed by: UROLOGY

## 2024-08-20 PROCEDURE — 6370000000 HC RX 637 (ALT 250 FOR IP): Performed by: UROLOGY

## 2024-08-20 PROCEDURE — 0W9M0ZZ DRAINAGE OF MALE PERINEUM, OPEN APPROACH: ICD-10-PCS | Performed by: UROLOGY

## 2024-08-20 PROCEDURE — 7100000001 HC PACU RECOVERY - ADDTL 15 MIN: Performed by: UROLOGY

## 2024-08-20 PROCEDURE — 85025 COMPLETE CBC W/AUTO DIFF WBC: CPT

## 2024-08-20 PROCEDURE — 87206 SMEAR FLUORESCENT/ACID STAI: CPT

## 2024-08-20 PROCEDURE — 2500000003 HC RX 250 WO HCPCS: Performed by: NURSE ANESTHETIST, CERTIFIED REGISTERED

## 2024-08-20 PROCEDURE — 2580000003 HC RX 258: Performed by: UROLOGY

## 2024-08-20 PROCEDURE — 6360000002 HC RX W HCPCS: Performed by: UROLOGY

## 2024-08-20 PROCEDURE — 2580000003 HC RX 258: Performed by: SPECIALIST

## 2024-08-20 PROCEDURE — 0Y950ZZ DRAINAGE OF RIGHT INGUINAL REGION, OPEN APPROACH: ICD-10-PCS | Performed by: UROLOGY

## 2024-08-20 PROCEDURE — 7100000000 HC PACU RECOVERY - FIRST 15 MIN: Performed by: UROLOGY

## 2024-08-20 PROCEDURE — 0V950ZZ DRAINAGE OF SCROTUM, OPEN APPROACH: ICD-10-PCS | Performed by: UROLOGY

## 2024-08-20 PROCEDURE — 87070 CULTURE OTHR SPECIMN AEROBIC: CPT

## 2024-08-20 RX ORDER — DIPHENHYDRAMINE HYDROCHLORIDE 50 MG/ML
12.5 INJECTION INTRAMUSCULAR; INTRAVENOUS
Status: DISCONTINUED | OUTPATIENT
Start: 2024-08-20 | End: 2024-08-20 | Stop reason: HOSPADM

## 2024-08-20 RX ORDER — PROPOFOL 10 MG/ML
INJECTION, EMULSION INTRAVENOUS PRN
Status: DISCONTINUED | OUTPATIENT
Start: 2024-08-20 | End: 2024-08-20 | Stop reason: SDUPTHER

## 2024-08-20 RX ORDER — FENTANYL CITRATE 50 UG/ML
25 INJECTION, SOLUTION INTRAMUSCULAR; INTRAVENOUS EVERY 5 MIN PRN
Status: DISCONTINUED | OUTPATIENT
Start: 2024-08-20 | End: 2024-08-20 | Stop reason: HOSPADM

## 2024-08-20 RX ORDER — ONDANSETRON 2 MG/ML
INJECTION INTRAMUSCULAR; INTRAVENOUS PRN
Status: DISCONTINUED | OUTPATIENT
Start: 2024-08-20 | End: 2024-08-20 | Stop reason: SDUPTHER

## 2024-08-20 RX ORDER — PROCHLORPERAZINE EDISYLATE 5 MG/ML
5 INJECTION INTRAMUSCULAR; INTRAVENOUS
Status: DISCONTINUED | OUTPATIENT
Start: 2024-08-20 | End: 2024-08-20 | Stop reason: HOSPADM

## 2024-08-20 RX ORDER — SODIUM CHLORIDE 9 MG/ML
INJECTION, SOLUTION INTRAVENOUS CONTINUOUS PRN
Status: DISCONTINUED | OUTPATIENT
Start: 2024-08-20 | End: 2024-08-20

## 2024-08-20 RX ORDER — NALOXONE HYDROCHLORIDE 0.4 MG/ML
INJECTION, SOLUTION INTRAMUSCULAR; INTRAVENOUS; SUBCUTANEOUS PRN
Status: DISCONTINUED | OUTPATIENT
Start: 2024-08-20 | End: 2024-08-20 | Stop reason: HOSPADM

## 2024-08-20 RX ORDER — LIDOCAINE HYDROCHLORIDE 10 MG/ML
INJECTION, SOLUTION EPIDURAL; INFILTRATION; INTRACAUDAL; PERINEURAL PRN
Status: DISCONTINUED | OUTPATIENT
Start: 2024-08-20 | End: 2024-08-20 | Stop reason: ALTCHOICE

## 2024-08-20 RX ORDER — SODIUM CHLORIDE 9 MG/ML
INJECTION, SOLUTION INTRAVENOUS PRN
Status: DISCONTINUED | OUTPATIENT
Start: 2024-08-20 | End: 2024-08-20 | Stop reason: HOSPADM

## 2024-08-20 RX ORDER — GLYCOPYRROLATE 0.2 MG/ML
INJECTION INTRAMUSCULAR; INTRAVENOUS PRN
Status: DISCONTINUED | OUTPATIENT
Start: 2024-08-20 | End: 2024-08-20 | Stop reason: SDUPTHER

## 2024-08-20 RX ORDER — MIDAZOLAM HYDROCHLORIDE 1 MG/ML
INJECTION INTRAMUSCULAR; INTRAVENOUS PRN
Status: DISCONTINUED | OUTPATIENT
Start: 2024-08-20 | End: 2024-08-20 | Stop reason: SDUPTHER

## 2024-08-20 RX ORDER — SODIUM CHLORIDE 0.9 % (FLUSH) 0.9 %
5-40 SYRINGE (ML) INJECTION EVERY 12 HOURS SCHEDULED
Status: DISCONTINUED | OUTPATIENT
Start: 2024-08-20 | End: 2024-08-20 | Stop reason: HOSPADM

## 2024-08-20 RX ORDER — LABETALOL HYDROCHLORIDE 5 MG/ML
5 INJECTION, SOLUTION INTRAVENOUS
Status: DISCONTINUED | OUTPATIENT
Start: 2024-08-20 | End: 2024-08-20 | Stop reason: HOSPADM

## 2024-08-20 RX ORDER — KETAMINE HYDROCHLORIDE 10 MG/ML
INJECTION, SOLUTION INTRAMUSCULAR; INTRAVENOUS PRN
Status: DISCONTINUED | OUTPATIENT
Start: 2024-08-20 | End: 2024-08-20 | Stop reason: SDUPTHER

## 2024-08-20 RX ORDER — HYDRALAZINE HYDROCHLORIDE 20 MG/ML
5 INJECTION INTRAMUSCULAR; INTRAVENOUS
Status: DISCONTINUED | OUTPATIENT
Start: 2024-08-20 | End: 2024-08-20 | Stop reason: HOSPADM

## 2024-08-20 RX ORDER — DEXAMETHASONE SODIUM PHOSPHATE 4 MG/ML
INJECTION, SOLUTION INTRA-ARTICULAR; INTRALESIONAL; INTRAMUSCULAR; INTRAVENOUS; SOFT TISSUE PRN
Status: DISCONTINUED | OUTPATIENT
Start: 2024-08-20 | End: 2024-08-20 | Stop reason: SDUPTHER

## 2024-08-20 RX ORDER — SODIUM CHLORIDE 0.9 % (FLUSH) 0.9 %
5-40 SYRINGE (ML) INJECTION PRN
Status: DISCONTINUED | OUTPATIENT
Start: 2024-08-20 | End: 2024-08-20 | Stop reason: HOSPADM

## 2024-08-20 RX ORDER — SODIUM CHLORIDE 9 MG/ML
INJECTION, SOLUTION INTRAVENOUS CONTINUOUS PRN
Status: DISCONTINUED | OUTPATIENT
Start: 2024-08-20 | End: 2024-08-20 | Stop reason: SDUPTHER

## 2024-08-20 RX ORDER — FENTANYL CITRATE 50 UG/ML
INJECTION, SOLUTION INTRAMUSCULAR; INTRAVENOUS PRN
Status: DISCONTINUED | OUTPATIENT
Start: 2024-08-20 | End: 2024-08-20 | Stop reason: SDUPTHER

## 2024-08-20 RX ORDER — LIDOCAINE HYDROCHLORIDE 20 MG/ML
INJECTION, SOLUTION INFILTRATION; PERINEURAL PRN
Status: DISCONTINUED | OUTPATIENT
Start: 2024-08-20 | End: 2024-08-20 | Stop reason: SDUPTHER

## 2024-08-20 RX ORDER — MEPERIDINE HYDROCHLORIDE 25 MG/ML
12.5 INJECTION INTRAMUSCULAR; INTRAVENOUS; SUBCUTANEOUS EVERY 5 MIN PRN
Status: DISCONTINUED | OUTPATIENT
Start: 2024-08-20 | End: 2024-08-20 | Stop reason: HOSPADM

## 2024-08-20 RX ORDER — OXYCODONE AND ACETAMINOPHEN 5; 325 MG/1; MG/1
1 TABLET ORAL EVERY 4 HOURS PRN
Status: DISCONTINUED | OUTPATIENT
Start: 2024-08-20 | End: 2024-08-22 | Stop reason: HOSPADM

## 2024-08-20 RX ORDER — ACETAMINOPHEN 500 MG
1000 TABLET ORAL ONCE
Status: COMPLETED | OUTPATIENT
Start: 2024-08-20 | End: 2024-08-20

## 2024-08-20 RX ORDER — METOCLOPRAMIDE HYDROCHLORIDE 5 MG/ML
10 INJECTION INTRAMUSCULAR; INTRAVENOUS ONCE
Status: COMPLETED | OUTPATIENT
Start: 2024-08-20 | End: 2024-08-20

## 2024-08-20 RX ADMIN — OXYCODONE HYDROCHLORIDE AND ACETAMINOPHEN 1 TABLET: 5; 325 TABLET ORAL at 15:55

## 2024-08-20 RX ADMIN — ACETAMINOPHEN 500 MG: 500 TABLET ORAL at 15:06

## 2024-08-20 RX ADMIN — SODIUM CHLORIDE: 9 INJECTION, SOLUTION INTRAVENOUS at 11:30

## 2024-08-20 RX ADMIN — MICONAZOLE NITRATE: 20 CREAM TOPICAL at 20:38

## 2024-08-20 RX ADMIN — FAMOTIDINE 20 MG: 10 INJECTION INTRAVENOUS at 11:19

## 2024-08-20 RX ADMIN — FENTANYL CITRATE 25 MCG: 50 INJECTION INTRAMUSCULAR; INTRAVENOUS at 12:52

## 2024-08-20 RX ADMIN — CARVEDILOL 12.5 MG: 6.25 TABLET, FILM COATED ORAL at 18:38

## 2024-08-20 RX ADMIN — SACUBITRIL AND VALSARTAN 1 TABLET: 49; 51 TABLET, FILM COATED ORAL at 20:35

## 2024-08-20 RX ADMIN — ONDANSETRON 4 MG: 2 INJECTION INTRAMUSCULAR; INTRAVENOUS at 11:54

## 2024-08-20 RX ADMIN — PROPOFOL 150 MG: 10 INJECTION, EMULSION INTRAVENOUS at 11:42

## 2024-08-20 RX ADMIN — AMPICILLIN SODIUM AND SULBACTAM SODIUM 3000 MG: 2; 1 INJECTION, POWDER, FOR SOLUTION INTRAMUSCULAR; INTRAVENOUS at 18:40

## 2024-08-20 RX ADMIN — AMPICILLIN SODIUM AND SULBACTAM SODIUM 3000 MG: 2; 1 INJECTION, POWDER, FOR SOLUTION INTRAMUSCULAR; INTRAVENOUS at 05:56

## 2024-08-20 RX ADMIN — FENTANYL CITRATE 50 MCG: 50 INJECTION, SOLUTION INTRAMUSCULAR; INTRAVENOUS at 11:42

## 2024-08-20 RX ADMIN — MIDAZOLAM 2 MG: 1 INJECTION INTRAMUSCULAR; INTRAVENOUS at 11:32

## 2024-08-20 RX ADMIN — OXYCODONE HYDROCHLORIDE AND ACETAMINOPHEN 1 TABLET: 5; 325 TABLET ORAL at 20:35

## 2024-08-20 RX ADMIN — LINEZOLID 600 MG: 600 TABLET, FILM COATED ORAL at 20:35

## 2024-08-20 RX ADMIN — LIDOCAINE HYDROCHLORIDE 100 MG: 20 INJECTION, SOLUTION INFILTRATION; PERINEURAL at 11:42

## 2024-08-20 RX ADMIN — FENTANYL CITRATE 50 MCG: 50 INJECTION, SOLUTION INTRAMUSCULAR; INTRAVENOUS at 11:50

## 2024-08-20 RX ADMIN — KETAMINE HYDROCHLORIDE 20 MG: 10 INJECTION INTRAMUSCULAR; INTRAVENOUS at 11:42

## 2024-08-20 RX ADMIN — DEXAMETHASONE SODIUM PHOSPHATE 4 MG: 4 INJECTION, SOLUTION INTRAMUSCULAR; INTRAVENOUS at 11:54

## 2024-08-20 RX ADMIN — METOCLOPRAMIDE 10 MG: 5 INJECTION, SOLUTION INTRAMUSCULAR; INTRAVENOUS at 11:19

## 2024-08-20 RX ADMIN — GLYCOPYRROLATE 0.2 MG: 0.2 INJECTION, SOLUTION INTRAMUSCULAR; INTRAVENOUS at 11:42

## 2024-08-20 RX ADMIN — ACETAMINOPHEN 1000 MG: 500 TABLET ORAL at 11:19

## 2024-08-20 ASSESSMENT — PAIN SCALES - GENERAL
PAINLEVEL_OUTOF10: 5
PAINLEVEL_OUTOF10: 8
PAINLEVEL_OUTOF10: 0
PAINLEVEL_OUTOF10: 4
PAINLEVEL_OUTOF10: 9
PAINLEVEL_OUTOF10: 7
PAINLEVEL_OUTOF10: 3

## 2024-08-20 ASSESSMENT — PAIN DESCRIPTION - PAIN TYPE: TYPE: SURGICAL PAIN

## 2024-08-20 ASSESSMENT — PAIN DESCRIPTION - ONSET: ONSET: GRADUAL

## 2024-08-20 ASSESSMENT — LIFESTYLE VARIABLES: SMOKING_STATUS: 0

## 2024-08-20 ASSESSMENT — ENCOUNTER SYMPTOMS: DYSPNEA ACTIVITY LEVEL: NO INTERVAL CHANGE

## 2024-08-20 ASSESSMENT — PAIN DESCRIPTION - LOCATION
LOCATION: SCROTUM
LOCATION: SCROTUM
LOCATION: GROIN

## 2024-08-20 ASSESSMENT — PAIN DESCRIPTION - FREQUENCY: FREQUENCY: INTERMITTENT

## 2024-08-20 ASSESSMENT — PAIN DESCRIPTION - DESCRIPTORS
DESCRIPTORS: SHARP;JABBING
DESCRIPTORS: BURNING;SHARP
DESCRIPTORS: ACHING;DISCOMFORT;SORE;THROBBING

## 2024-08-20 ASSESSMENT — PAIN - FUNCTIONAL ASSESSMENT: PAIN_FUNCTIONAL_ASSESSMENT: ACTIVITIES ARE NOT PREVENTED

## 2024-08-20 ASSESSMENT — PAIN DESCRIPTION - ORIENTATION: ORIENTATION: MID;LOWER

## 2024-08-20 NOTE — PLAN OF CARE
Problem: Discharge Planning  Goal: Discharge to home or other facility with appropriate resources  8/20/2024 1219 by Madalyn Ordaz RN  Outcome: Progressing  8/19/2024 2300 by Leslie Fitzpatrick RN  Outcome: Progressing     Problem: Pain  Goal: Verbalizes/displays adequate comfort level or baseline comfort level  8/20/2024 1219 by Madalyn Ordaz RN  Outcome: Progressing  8/19/2024 2300 by Leslie Fitzpatrick RN  Outcome: Progressing     Problem: Chronic Conditions and Co-morbidities  Goal: Patient's chronic conditions and co-morbidity symptoms are monitored and maintained or improved  8/19/2024 2300 by Leslie Fitzpatrick RN  Outcome: Progressing

## 2024-08-20 NOTE — ANESTHESIA POSTPROCEDURE EVALUATION
Department of Anesthesiology  Postprocedure Note    Patient: Les Romero  MRN: [id number]  YOB: 1955  Date of evaluation: 8/20/2024    Procedure Summary       Date: 08/20/24 Room / Location: [address]    Anesthesia Start: 1132 Anesthesia Stop: 1232    Procedure: INCISION AND DRAINAGE SCROTAL ABSCESS (Scrotum) Diagnosis:       Scrotal abscess      (Scrotal abscess [N49.2])    Surgeons: Gurdeep Corcoran MD Responsible Provider: Dustin Jimenez DO    Anesthesia Type: General ASA Status: 3 - Emergent            Anesthesia Type: General    Ruthann Phase I: Ruthann Score: 8    Ruthann Phase II:      Anesthesia Post Evaluation    Patient location during evaluation: bedside  Patient participation: complete - patient participated  Level of consciousness: awake  Pain score: 3  Airway patency: patent  Nausea & Vomiting: no vomiting and no nausea  Cardiovascular status: hemodynamically stable  Respiratory status: acceptable  Hydration status: stable  Comments: Patient seen and examined.  Progressing as expected.  No anesthetic related questions or concerns at this time.  Multimodal analgesia pain management approach  Pain management: adequate    No notable events documented.

## 2024-08-20 NOTE — CARE COORDINATION
Relevant Problems   ENDO   (+) Type 2 diabetes mellitus with hypoglycemia, with long-term current use of insulin (HCC)       Physical Exam    Airway   Mallampati: II  TM distance: >3 FB  Neck ROM: full       Cardiovascular - normal exam  Rhythm: regular  Rate: normal  (-) murmur     Dental - normal exam           Pulmonary - normal exam  Breath sounds clear to auscultation     Abdominal    Neurological - normal exam                 Anesthesia Plan    ASA 2       Plan - spinal   Neuraxial block will be primary anesthetic        Induction: intravenous    Postoperative Plan: Postoperative administration of opioids is intended.    Pertinent diagnostic labs and testing reviewed    Informed Consent:    Anesthetic plan and risks discussed with patient.    Use of blood products discussed with: patient whom consented to blood products.          8/20/2024  Social Work Discharge Planning:Awaiting surgery plan. Wound cultures pending.  I&D and urology following.  Possible I&D again.  Pt is independent from home with his spouse and uses no DME. Spouse will transport at discharge.Electronically signed by CLEM Price on 8/20/2024 at 9:38 AM

## 2024-08-20 NOTE — ANESTHESIA PRE PROCEDURE
Department of Anesthesiology  Preprocedure Note       Name:  Les Romero   Age:  69 y.o.  :  1955                                          MRN:  [id number]         Date:  2024      Surgeon: Surgeon(s):  Gurdeep Corcoran MD    Procedure: Procedure(s):  INCISION AND DRAINAGE SCROTAL ABSCESS (DR SHANI 1100)    Medications prior to admission:   Prior to Admission medications    Medication Sig Start Date End Date Taking? Authorizing Provider   fluticasone (FLONASE) 50 MCG/ACT nasal spray 1 spray by Each Nostril route daily   Yes ProviderNuvia MD   loratadine (CLARITIN) 10 MG capsule Take 1 capsule by mouth daily   Yes ProviderNuvia MD   fluticasone (FLONASE) 50 MCG/ACT nasal spray 2 sprays by Each Nostril route daily 24  Yes Jayson Hendrix DO   carvedilol (COREG) 12.5 MG tablet Take 1 tablet by mouth 2 times daily 24  Yes Nehemiah Hanson MD   famotidine (PEPCID) 20 MG tablet Take 1 tablet by mouth 2 times daily   Yes Provider, MD Nuvia   empagliflozin (JARDIANCE) 10 MG tablet Take 1 tablet by mouth daily 3/18/24  Yes Nehemiah Hanson MD   spironolactone (ALDACTONE) 25 MG tablet Take 1 tablet by mouth daily 3/4/24  Yes Nehemiah Hanson MD   allopurinol (ZYLOPRIM) 300 MG tablet Take 1 tablet by mouth daily   Yes ProviderNuvia MD   aspirin 81 MG EC tablet Take 1 tablet by mouth daily   Yes ProviderNuvia MD   torsemide (DEMADEX) 10 MG tablet Take 1 tablet by mouth daily  Patient taking differently: Take 1 tablet by mouth daily Taking every other day 24  Yes Nehemiah Hanson MD   sacubitril-valsartan (ENTRESTO) 49-51 MG per tablet Take 1 tablet by mouth 2 times daily 24  Yes Nehemiah Hanson MD       Current medications:    Current Facility-Administered Medications   Medication Dose Route Frequency Provider Last Rate Last Admin    glucose chewable tablet 16 g  4 tablet Oral PRN Les Mendez MD        dextrose bolus 10% 125 mL  125 mL

## 2024-08-20 NOTE — PROGRESS NOTES
Pt spouse at nurses station, he received something for pain in recovery, not helping. No PRN medications ordered, page sent to urology for medication    Electronically signed by Jasmyne Tamez RN on 8/20/2024     Page sent to dr. Mendez as well at the wifes request - defer to the surgeon       Electronically signed by Jasmyne Tamez RN on 8/20/2024 at 2:32 PM

## 2024-08-20 NOTE — PLAN OF CARE
Problem: Discharge Planning  Goal: Discharge to home or other facility with appropriate resources  8/19/2024 2300 by Leslie Fitzpatrick RN  Outcome: Progressing  8/19/2024 1102 by Justin Cohen RN  Outcome: Progressing     Problem: Pain  Goal: Verbalizes/displays adequate comfort level or baseline comfort level  8/19/2024 2300 by Leslie Fitzpatrick RN  Outcome: Progressing  8/19/2024 1102 by Justin Cohen RN  Outcome: Progressing     Problem: Chronic Conditions and Co-morbidities  Goal: Patient's chronic conditions and co-morbidity symptoms are monitored and maintained or improved  8/19/2024 2300 by Leslie Fitzpatrick RN  Outcome: Progressing  8/19/2024 1102 by Justin Cohen RN  Outcome: Progressing

## 2024-08-20 NOTE — PROGRESS NOTES
8/20/2024 8:50 AM  Les Romero  [id number]    Subjective:    Awake and alert  Wife present   Pain is tolerable   Still with drainage     Review of Systems  Constitutional: No fever or chills   Respiratory: negative for cough and hemoptysis  Cardiovascular: negative for chest pain and dyspnea  Gastrointestinal: negative for abdominal pain, diarrhea, nausea and vomiting   : See above  Derm: negative for rash and skin lesion(s)  Neurological: negative for seizures and tremors  Musculoskeletal: Negative    Psychiatric: Negative   All other reviews are negative      Scheduled Meds:   enoxaparin  40 mg SubCUTAneous Daily    carvedilol  12.5 mg Oral BID WC    sacubitril-valsartan  1 tablet Oral BID    spironolactone  25 mg Oral Daily    allopurinol  300 mg Oral Daily    pantoprazole  40 mg Oral QAM AC    insulin lispro  0-4 Units SubCUTAneous 4x Daily AC & HS    linezolid  600 mg Oral 2 times per day    ampicillin-sulbactam  3,000 mg IntraVENous Q6H    miconazole   Topical BID       Objective:  Vitals:    08/20/24 0704   BP: 135/89   Pulse: 80   Resp: 16   Temp: 98.1 °F (36.7 °C)   SpO2: 97%         Allergies: Ace inhibitors    General Appearance: alert and oriented to person, place and time and in no acute distress  Skin: no rash or erythema  Head: normocephalic and atraumatic  Pulmonary/Chest: normal air movement, no respiratory distress  Abdomen: soft, non-tender, non-distended  Genitourinary: right groin indurated, erythema, abscess in the perineal area with drainage. Slight fluctuance, no crepitus   Extremities: no cyanosis, clubbing or edema         Labs:     Recent Labs     08/19/24  0731      K 4.5      CO2 20*   BUN 21   CREATININE 1.3*   GLUCOSE 99   CALCIUM 9.1       Lab Results   Component Value Date/Time    HGB 13.4 08/19/2024 07:31 AM    HCT 39.8 08/19/2024 07:31 AM       No results found for: \"PSA\"        Assessment/Plan:  Perineal abscess  Scrotal cellulitis extending into the right  inguinal area     Continue the antibiotics per ID   Keep NPO   Will proceed to OR today for incision and drainage of perineal abscess   Consents to the above  Will follow     VJ Montenegro - CNP   [facility]  Urology

## 2024-08-20 NOTE — PROGRESS NOTES
Subjective:    The patient is awake and alert.  No problems overnight.  Denies chest pain, angina, and dyspnea.  Denies abdominal pain.  Tolerating diet.  No nausea or vomiting.No fever or chills, + bloody drainage from scrotal abscess. Urology and ID on case.    Objective:    BP (!) 141/81   Pulse 85   Temp 97.8 °F (36.6 °C) (Oral)   Resp 18   Ht 1.753 m (5' 9\")   Wt 98.5 kg (217 lb 2.5 oz)   SpO2 96%   BMI 32.07 kg/m²   Neck: No goiter, bruit, or LA  Heart:  RRR, no murmurs, gallops, or rubs.  Lungs:  CTA bilaterally, no wheeze, rales or rhonchi  Abd: bowel sounds present, nontender, nondistended, no masses  Extrem:  No clubbing, cyanosis, or edema, 2+ peripheral pulses, FROM  + right inguinal erythema and swelling has decreased some. + bloody drainage from base of scrotum, + right inguinal LA    CBC:   Lab Results   Component Value Date/Time    WBC 15.8 08/19/2024 07:31 AM    RBC 4.20 08/19/2024 07:31 AM    HGB 13.4 08/19/2024 07:31 AM    HCT 39.8 08/19/2024 07:31 AM    MCV 94.8 08/19/2024 07:31 AM    MCH 31.9 08/19/2024 07:31 AM    MCHC 33.7 08/19/2024 07:31 AM    RDW 12.6 08/19/2024 07:31 AM     08/19/2024 07:31 AM    MPV 10.2 08/19/2024 07:31 AM     CMP:    Lab Results   Component Value Date/Time     08/19/2024 07:31 AM    K 4.5 08/19/2024 07:31 AM     08/19/2024 07:31 AM    CO2 20 08/19/2024 07:31 AM    BUN 21 08/19/2024 07:31 AM    CREATININE 1.3 08/19/2024 07:31 AM    LABGLOM 62 08/19/2024 07:31 AM    LABGLOM 54 04/01/2024 10:45 AM    GLUCOSE 99 08/19/2024 07:31 AM    CALCIUM 9.1 08/19/2024 07:31 AM    BILITOT 0.4 08/19/2024 07:31 AM    ALKPHOS 85 08/19/2024 07:31 AM    AST 16 08/19/2024 07:31 AM    ALT 26 08/19/2024 07:31 AM          Current Facility-Administered Medications:     glucose chewable tablet 16 g, 4 tablet, Oral, PRN, Les Mendez MD    dextrose bolus 10% 125 mL, 125 mL, IntraVENous, PRN **OR** dextrose bolus 10% 250 mL, 250 mL, IntraVENous, PRN, Les Mendez  systolic CHF- currently well compensated  CKD3a- since institution of GDMT, creatinine has been stable around 1.3  5.  IFG- pt is not diabetic, BS are adequately controlled in face of infection.        Les Mendez MD  6:27 AM  8/20/2024

## 2024-08-20 NOTE — OP NOTE
Operative Note      Patient: Les Romero  YOB: 1955  MRN: [id number]    Date of Procedure: 8/20/2024    Pre-Op Diagnosis Codes:      * Scrotal abscess [N49.2]    Post-Op Diagnosis: Same, perineal abscess, right groin abscess       Procedure: Incision and drainage scrotal, perineum, right groin abscess    Surgeon(s):  Gurdeep Corcoran MD    Assistant:   * No surgical staff found *    Anesthesia: General    Estimated Blood Loss (mL): less than 50     Complications: None    Specimens:   ID Type Source Tests Collected by Time Destination   1 : PERINEAL TISSUE CULTURE Tissue Tissue CULTURE, ANAEROBIC, CULTURE, FUNGUS, GRAM STAIN, CULTURE, SURGICAL, CULTURE WITH SMEAR, ACID FAST Gurdeep Hameed MD 8/20/2024 1204    2 : SCROTAL ABSCESS Body Fluid Fluid CULTURE, ANAEROBIC, CULTURE, FUNGUS, GRAM STAIN, CULTURE, BODY FLUID, CULTURE WITH SMEAR, ACID FAST Gurdeep Hameed MD 8/20/2024 1205        Implants:  * No implants in log *      Drains: * No LDAs found *        INDICATION FOR PROCEDURE: Les Romero is a 69 y.o.  male who has a significant perineal abscess that has been draining.  There still remains significant induration and fluctuance.  He presents for incision and drainage of this abscess.  He understands the risks, benefits, and alternatives of the procedure, signed informed consent, and agreed to proceed.     PROCEDURE: The patient was brought into the operating room and placed under anesthesia in the dorsal lithotomy position. He was shaven, prepped and draped in sterile fashion.    Local anesthesia was injected into the perineal wound.  The perineal wound was incised with a knife.  A large amount of purulent drainage was evacuated.  Finger dissection was then performed anteriorly as well as posteriorly and there were large pockets formed by this abscess able to be tracked into the scrotum as well as toward the rectum but not involving the rectum.  There is no  necrotic tissue and there was no need to resect tissue.  Tissue cultures were obtained and sent as were swab cultures.    The right groin area was indurated and the decision was made to make another incision in the right groin.  Purulent material was expressed from this area as well.  Finger dissection was also performed creating a cavity and evacuating pus.    A pulse  was then used to irrigate both of these incisions with 2 L of fluid under pressure.  Each wound was packed with Kerlix.  Cyrus was awakened from anesthesia and taken to recovery in stable condition.    Gurdeep Corcoran MD  8/20/2024  12:25 PM        Electronically signed by Gurdeep Corcoran MD on 8/20/2024 at 12:25 PM

## 2024-08-21 LAB
ALBUMIN SERPL-MCNC: 3.5 G/DL (ref 3.5–5.2)
ALP SERPL-CCNC: 92 U/L (ref 40–129)
ALT SERPL-CCNC: 32 U/L (ref 0–40)
ANION GAP SERPL CALCULATED.3IONS-SCNC: 10 MMOL/L (ref 7–16)
AST SERPL-CCNC: 21 U/L (ref 0–39)
BASOPHILS # BLD: 0.07 K/UL (ref 0–0.2)
BASOPHILS NFR BLD: 1 % (ref 0–2)
BILIRUB SERPL-MCNC: 0.2 MG/DL (ref 0–1.2)
BUN SERPL-MCNC: 16 MG/DL (ref 6–23)
CALCIUM SERPL-MCNC: 9.6 MG/DL (ref 8.6–10.2)
CHLORIDE SERPL-SCNC: 103 MMOL/L (ref 98–107)
CO2 SERPL-SCNC: 22 MMOL/L (ref 22–29)
CREAT SERPL-MCNC: 1 MG/DL (ref 0.7–1.2)
EOSINOPHIL # BLD: 0.03 K/UL (ref 0.05–0.5)
EOSINOPHILS RELATIVE PERCENT: 0 % (ref 0–6)
ERYTHROCYTE [DISTWIDTH] IN BLOOD BY AUTOMATED COUNT: 12.7 % (ref 11.5–15)
GFR, ESTIMATED: 79 ML/MIN/1.73M2
GLUCOSE SERPL-MCNC: 129 MG/DL (ref 74–99)
HCT VFR BLD AUTO: 45.4 % (ref 37–54)
HGB BLD-MCNC: 14.9 G/DL (ref 12.5–16.5)
IMM GRANULOCYTES # BLD AUTO: 0.3 K/UL (ref 0–0.58)
IMM GRANULOCYTES NFR BLD: 2 % (ref 0–5)
LYMPHOCYTES NFR BLD: 1.35 K/UL (ref 1.5–4)
LYMPHOCYTES RELATIVE PERCENT: 9 % (ref 20–42)
MCH RBC QN AUTO: 31.5 PG (ref 26–35)
MCHC RBC AUTO-ENTMCNC: 32.8 G/DL (ref 32–34.5)
MCV RBC AUTO: 96 FL (ref 80–99.9)
MONOCYTES NFR BLD: 0.99 K/UL (ref 0.1–0.95)
MONOCYTES NFR BLD: 7 % (ref 2–12)
NEUTROPHILS NFR BLD: 82 % (ref 43–80)
NEUTS SEG NFR BLD: 12.11 K/UL (ref 1.8–7.3)
PLATELET # BLD AUTO: 318 K/UL (ref 130–450)
PMV BLD AUTO: 9.9 FL (ref 7–12)
POTASSIUM SERPL-SCNC: 4.6 MMOL/L (ref 3.5–5)
PROT SERPL-MCNC: 7.3 G/DL (ref 6.4–8.3)
RBC # BLD AUTO: 4.73 M/UL (ref 3.8–5.8)
SODIUM SERPL-SCNC: 135 MMOL/L (ref 132–146)
WBC OTHER # BLD: 14.9 K/UL (ref 4.5–11.5)

## 2024-08-21 PROCEDURE — 36415 COLL VENOUS BLD VENIPUNCTURE: CPT

## 2024-08-21 PROCEDURE — 6360000002 HC RX W HCPCS: Performed by: UROLOGY

## 2024-08-21 PROCEDURE — 85025 COMPLETE CBC W/AUTO DIFF WBC: CPT

## 2024-08-21 PROCEDURE — 80053 COMPREHEN METABOLIC PANEL: CPT

## 2024-08-21 PROCEDURE — 6370000000 HC RX 637 (ALT 250 FOR IP): Performed by: NURSE PRACTITIONER

## 2024-08-21 PROCEDURE — 1200000000 HC SEMI PRIVATE

## 2024-08-21 PROCEDURE — 6370000000 HC RX 637 (ALT 250 FOR IP): Performed by: UROLOGY

## 2024-08-21 PROCEDURE — 2580000003 HC RX 258: Performed by: UROLOGY

## 2024-08-21 RX ADMIN — SACUBITRIL AND VALSARTAN 1 TABLET: 49; 51 TABLET, FILM COATED ORAL at 20:18

## 2024-08-21 RX ADMIN — OXYCODONE HYDROCHLORIDE AND ACETAMINOPHEN 1 TABLET: 5; 325 TABLET ORAL at 03:13

## 2024-08-21 RX ADMIN — AMPICILLIN SODIUM AND SULBACTAM SODIUM 3000 MG: 2; 1 INJECTION, POWDER, FOR SOLUTION INTRAMUSCULAR; INTRAVENOUS at 06:10

## 2024-08-21 RX ADMIN — ENOXAPARIN SODIUM 40 MG: 100 INJECTION SUBCUTANEOUS at 08:50

## 2024-08-21 RX ADMIN — MICONAZOLE NITRATE: 20 CREAM TOPICAL at 08:50

## 2024-08-21 RX ADMIN — OXYCODONE HYDROCHLORIDE AND ACETAMINOPHEN 1 TABLET: 5; 325 TABLET ORAL at 23:47

## 2024-08-21 RX ADMIN — AMPICILLIN SODIUM AND SULBACTAM SODIUM 3000 MG: 2; 1 INJECTION, POWDER, FOR SOLUTION INTRAMUSCULAR; INTRAVENOUS at 12:08

## 2024-08-21 RX ADMIN — LINEZOLID 600 MG: 600 TABLET, FILM COATED ORAL at 20:18

## 2024-08-21 RX ADMIN — OXYCODONE HYDROCHLORIDE AND ACETAMINOPHEN 1 TABLET: 5; 325 TABLET ORAL at 17:22

## 2024-08-21 RX ADMIN — ALLOPURINOL 300 MG: 300 TABLET ORAL at 08:50

## 2024-08-21 RX ADMIN — PANTOPRAZOLE SODIUM 40 MG: 40 TABLET, DELAYED RELEASE ORAL at 06:10

## 2024-08-21 RX ADMIN — LINEZOLID 600 MG: 600 TABLET, FILM COATED ORAL at 08:50

## 2024-08-21 RX ADMIN — SACUBITRIL AND VALSARTAN 1 TABLET: 49; 51 TABLET, FILM COATED ORAL at 08:50

## 2024-08-21 RX ADMIN — AMPICILLIN SODIUM AND SULBACTAM SODIUM 3000 MG: 2; 1 INJECTION, POWDER, FOR SOLUTION INTRAMUSCULAR; INTRAVENOUS at 18:10

## 2024-08-21 RX ADMIN — OXYCODONE HYDROCHLORIDE AND ACETAMINOPHEN 1 TABLET: 5; 325 TABLET ORAL at 11:58

## 2024-08-21 RX ADMIN — CARVEDILOL 12.5 MG: 6.25 TABLET, FILM COATED ORAL at 17:19

## 2024-08-21 RX ADMIN — SPIRONOLACTONE 25 MG: 25 TABLET ORAL at 08:50

## 2024-08-21 RX ADMIN — AMPICILLIN SODIUM AND SULBACTAM SODIUM 3000 MG: 2; 1 INJECTION, POWDER, FOR SOLUTION INTRAMUSCULAR; INTRAVENOUS at 00:31

## 2024-08-21 RX ADMIN — CARVEDILOL 12.5 MG: 6.25 TABLET, FILM COATED ORAL at 08:50

## 2024-08-21 ASSESSMENT — PAIN DESCRIPTION - LOCATION
LOCATION: GROIN

## 2024-08-21 ASSESSMENT — PAIN DESCRIPTION - DESCRIPTORS
DESCRIPTORS: ACHING;SORE
DESCRIPTORS: DISCOMFORT;ACHING;SORE
DESCRIPTORS: ACHING;DISCOMFORT;SORE;THROBBING

## 2024-08-21 ASSESSMENT — PAIN SCALES - GENERAL
PAINLEVEL_OUTOF10: 7
PAINLEVEL_OUTOF10: 7
PAINLEVEL_OUTOF10: 5

## 2024-08-21 ASSESSMENT — PAIN DESCRIPTION - ORIENTATION: ORIENTATION: MID;LOWER

## 2024-08-21 NOTE — PROGRESS NOTES
8/21/2024 8:48 AM  Les Romero  [id number]    Subjective:      Sitting up in bed  Nursing present  States his pain is significantly improved since incision and drainage  He reports that he removed the dressing by accident this morning  Denies fevers or chills    Review of Systems  Constitutional: No fever or chills   Respiratory: negative for cough and hemoptysis  Cardiovascular: negative for chest pain and dyspnea  Gastrointestinal: negative for abdominal pain, diarrhea, nausea and vomiting   : See above  Derm: negative for rash and skin lesion(s)  Neurological: negative for seizures and tremors  Musculoskeletal: Negative    Psychiatric: Negative   All other reviews are negative      Scheduled Meds:   enoxaparin  40 mg SubCUTAneous Daily    carvedilol  12.5 mg Oral BID WC    sacubitril-valsartan  1 tablet Oral BID    spironolactone  25 mg Oral Daily    allopurinol  300 mg Oral Daily    pantoprazole  40 mg Oral QAM AC    linezolid  600 mg Oral 2 times per day    ampicillin-sulbactam  3,000 mg IntraVENous Q6H    miconazole   Topical BID       Objective:  Vitals:    08/21/24 0710   BP: (!) 128/91   Pulse: 94   Resp: 16   Temp: 97.7 °F (36.5 °C)   SpO2: 94%         Allergies: Ace inhibitors    General Appearance: alert and oriented to person, place and time and in no acute distress  Skin: no rash or erythema  Head: normocephalic and atraumatic  Pulmonary/Chest: normal air movement, no respiratory distress  Abdomen: soft, non-tender, non-distended  Genitourinary: I I&D sites with dressing intact, evidence of drainage, no crepitus  Extremities: no cyanosis, clubbing or edema         Labs:     Recent Labs     08/21/24  0315      K 4.6      CO2 22   BUN 16   CREATININE 1.0   GLUCOSE 129*   CALCIUM 9.6       Lab Results   Component Value Date/Time    HGB 14.9 08/21/2024 03:15 AM    HCT 45.4 08/21/2024 03:15 AM       No results found for: \"PSA\"      Assessment/Plan:  POD #1 s/p incision and drainage of

## 2024-08-21 NOTE — PLAN OF CARE
Problem: Discharge Planning  Goal: Discharge to home or other facility with appropriate resources  8/21/2024 0104 by Leslie Fitzpatrick RN  Outcome: Progressing  8/20/2024 1219 by Madalyn Ordaz RN  Outcome: Progressing     Problem: Pain  Goal: Verbalizes/displays adequate comfort level or baseline comfort level  8/21/2024 0104 by Leslie Fitzpatrick RN  Outcome: Progressing  8/20/2024 1219 by Madalyn Ordaz RN  Outcome: Progressing     Problem: Chronic Conditions and Co-morbidities  Goal: Patient's chronic conditions and co-morbidity symptoms are monitored and maintained or improved  Outcome: Progressing

## 2024-08-21 NOTE — PROGRESS NOTES
Subjective:    The patient is awake and alert.  No problems overnight.  Denies chest pain, angina, and dyspnea.  Denies abdominal pain.  Tolerating diet.  No nausea or vomiting. He is s/p I&D yesterday with evidence of purulent material from right groin into perineum. Cultures suggesting Staph aureus. He is on unasyn.     Objective:    /78   Pulse 89   Temp 97.8 °F (36.6 °C) (Oral)   Resp 14   Ht 1.753 m (5' 9\")   Wt 101.9 kg (224 lb 10.4 oz)   SpO2 98%   BMI 33.17 kg/m²   Neck: No goiter, bruit, or LA  Heart:  RRR, no murmurs, gallops, or rubs.  Lungs:  CTA bilaterally, no wheeze, rales or rhonchi  Abd: bowel sounds present, nontender, nondistended, no masses  Extrem:  No clubbing, cyanosis, or edema, 2+ peripheral pulses, FROM  Right groin is indurated and firm and tender, There is incisions in right groin area and in perineum. There is evidence of bloody draiange.     CBC:   Lab Results   Component Value Date/Time    WBC 14.9 08/21/2024 03:15 AM    RBC 4.73 08/21/2024 03:15 AM    HGB 14.9 08/21/2024 03:15 AM    HCT 45.4 08/21/2024 03:15 AM    MCV 96.0 08/21/2024 03:15 AM    MCH 31.5 08/21/2024 03:15 AM    MCHC 32.8 08/21/2024 03:15 AM    RDW 12.7 08/21/2024 03:15 AM     08/21/2024 03:15 AM    MPV 9.9 08/21/2024 03:15 AM     CMP:    Lab Results   Component Value Date/Time     08/21/2024 03:15 AM    K 4.6 08/21/2024 03:15 AM     08/21/2024 03:15 AM    CO2 22 08/21/2024 03:15 AM    BUN 16 08/21/2024 03:15 AM    CREATININE 1.0 08/21/2024 03:15 AM    LABGLOM 79 08/21/2024 03:15 AM    LABGLOM 54 04/01/2024 10:45 AM    GLUCOSE 129 08/21/2024 03:15 AM    CALCIUM 9.6 08/21/2024 03:15 AM    BILITOT 0.2 08/21/2024 03:15 AM    ALKPHOS 92 08/21/2024 03:15 AM    AST 21 08/21/2024 03:15 AM    ALT 32 08/21/2024 03:15 AM          Current Facility-Administered Medications:     oxyCODONE-acetaminophen (PERCOCET) 5-325 MG per tablet 1 tablet, 1 tablet, Oral, Q4H PRN, Carmencita Guerrero, APRN - CNP, 1

## 2024-08-21 NOTE — PROGRESS NOTES
Initial Inpatient Wound Care    Admit Date: 8/18/2024  4:29 PM    Reason for consult:  I&D incision site    Significant history:  HTN  Adm groin swelling, testicle  and scrotum  Wound history:  POA, post I&D perineum scrotum    Findings:  alert and oriented. Wife present    Scrotal wound-small slit. Depth up to 5 cm. Packed with iodoform        Perineal wound 2x0.5. depth approx 6 cm x4. - repacked with ns kerlex      No active drainage or odor noted  Areas packed - wife educated and shown  Inner thigh yeast/fungal rash-miconazole cream in room      Plan:continue packing. Scrotumw ith iodoform, scrotum with ns kerlex  FERMIN Thomas, Wound Care        Denisa Pinon RN 8/21/2024 2:38 PM

## 2024-08-21 NOTE — CARE COORDINATION
8/21/2024  Social Work Discharge Planning:AWAITING SURGERY CULTURES. IV ATB .PLAN IS HOME WITH SPOUSE WHO WILL TRANSPORT AT DISCHARGE.  IV ATB-AWAIT ATB PLAN.PT MAY NEED HHC FOR WOUND CARE. SW MADE A REFERRAL TO [facility]. THEY ARE FOLLOWING. ORDER WILL BE NEEDED. Electronically signed by CLEM Price on 8/21/2024 at 10:08 AM

## 2024-08-21 NOTE — PROGRESS NOTES
[facility]  [facility]  Progress Note    SUBJECTIVE:  Chief Complaint   Patient presents with    Groin Swelling     Redness and swelling to testicle and scrotum, onset last Thursday. Denies fever/chills.      Patient is tolerating medications. No reported adverse drug reactions.  No nausea, vomiting, diarrhea.  Spoke with wife at bedside - reports patient was taking jardience and is asking if this caused the abscesses    Review of systems:  As stated above in the chief complaint, otherwise negative.    Medications:  Scheduled Meds:   enoxaparin  40 mg SubCUTAneous Daily    carvedilol  12.5 mg Oral BID WC    sacubitril-valsartan  1 tablet Oral BID    spironolactone  25 mg Oral Daily    allopurinol  300 mg Oral Daily    pantoprazole  40 mg Oral QAM AC    linezolid  600 mg Oral 2 times per day    ampicillin-sulbactam  3,000 mg IntraVENous Q6H    miconazole   Topical BID     Continuous Infusions:   dextrose       PRN Meds:oxyCODONE-acetaminophen, glucose, dextrose bolus **OR** dextrose bolus, glucagon (rDNA), dextrose, acetaminophen    OBJECTIVE:  BP (!) 142/87   Pulse 78   Temp 97.7 °F (36.5 °C) (Oral)   Resp 16   Ht 1.753 m (5' 9\")   Wt 101.9 kg (224 lb 10.4 oz)   SpO2 94%   BMI 33.17 kg/m²   Temp  Av.5 °F (36.4 °C)  Min: 96.8 °F (36 °C)  Max: 97.8 °F (36.6 °C)  Constitutional: The patient is awake, alert, and oriented.   Skin: Warm and dry. No rashes were noted.   HEENT: Round and reactive pupils.  Moist mucous membranes.  No ulcerations or thrush.  Neck: Supple to movements.   Chest: No use of accessory muscles to breathe. Symmetrical expansion.  No wheezing, crackles or rhonchi.  Cardiovascular: S1 and S2 are rhythmic and regular. No murmurs appreciated.   Abdomen: Positive bowel sounds to auscultation. Benign to palpation. No masses felt. No hepatosplenomegaly.  Groin and perineum are dressed  Extremities: No clubbing, no cyanosis, no edema.  Lines:  Peripheral.    Laboratory and Tests:  Lab Results   Component Value Date    .0 (H) 08/18/2024     Lab Results   Component Value Date    SEDRATE 46 (H) 08/18/2024       Radiology:  Reviewed     Microbiology:   Surgical culture 8/20: M_SA  Wound culture 8/19: M_SA, Corynebacterium, Morganella morganii (light growth)     Recent Labs     08/18/24  1705   PROCAL 0.15*       ASSESSMENT:  Perineal abscess and right groin abscess, status post incision and drainage 8/20  Cellulitis right inguinal and pubic area secondary to perineal abscess  Leukocytosis associated to the above  Doubt epididymoorchitis  Inguinal intertrigo    PLAN:  Continue Unasyn and Linezolid   Check final cultures & sensitivities   Monitor labs    VJ Bal - CNP  12:01 PM  8/21/2024    Patient seen and examined. I had a face to face encounter with the patient. Agree with exam.  Assessment and plan as outlined above and directed by me. Addition and corrections were done as deemed appropriate. My exam and plan include: The patient underwent debridement.  Superficial wound culture growing Morganella.  Surgical cultures growing Staphylococcus aureus.  Continue Linezolid.  Continue Unasyn for now.  He will most likely be discharged on oral antibiotics.    Cong Wilson MD  8/21/2024  3:19 PM

## 2024-08-22 VITALS
OXYGEN SATURATION: 96 % | TEMPERATURE: 97.6 F | RESPIRATION RATE: 18 BRPM | BODY MASS INDEX: 33.27 KG/M2 | WEIGHT: 224.65 LBS | HEART RATE: 74 BPM | SYSTOLIC BLOOD PRESSURE: 143 MMHG | HEIGHT: 69 IN | DIASTOLIC BLOOD PRESSURE: 82 MMHG

## 2024-08-22 PROBLEM — N49.2 SCROTAL ABSCESS: Status: ACTIVE | Noted: 2024-08-22

## 2024-08-22 PROBLEM — N45.3 EPIDIDYMO-ORCHITIS: Chronic | Status: ACTIVE | Noted: 2024-08-18

## 2024-08-22 LAB
ALBUMIN SERPL-MCNC: 3.2 G/DL (ref 3.5–5.2)
ALP SERPL-CCNC: 80 U/L (ref 40–129)
ALT SERPL-CCNC: 35 U/L (ref 0–40)
ANION GAP SERPL CALCULATED.3IONS-SCNC: 8 MMOL/L (ref 7–16)
AST SERPL-CCNC: 27 U/L (ref 0–39)
BASOPHILS # BLD: 0 K/UL (ref 0–0.2)
BASOPHILS NFR BLD: 0 % (ref 0–2)
BILIRUB SERPL-MCNC: <0.2 MG/DL (ref 0–1.2)
BUN SERPL-MCNC: 12 MG/DL (ref 6–23)
CALCIUM SERPL-MCNC: 8.8 MG/DL (ref 8.6–10.2)
CHLORIDE SERPL-SCNC: 104 MMOL/L (ref 98–107)
CO2 SERPL-SCNC: 26 MMOL/L (ref 22–29)
CREAT SERPL-MCNC: 1.1 MG/DL (ref 0.7–1.2)
EOSINOPHIL # BLD: 0.59 K/UL (ref 0.05–0.5)
EOSINOPHILS RELATIVE PERCENT: 7 % (ref 0–6)
ERYTHROCYTE [DISTWIDTH] IN BLOOD BY AUTOMATED COUNT: 12.9 % (ref 11.5–15)
GFR, ESTIMATED: 73 ML/MIN/1.73M2
GLUCOSE SERPL-MCNC: 89 MG/DL (ref 74–99)
HCT VFR BLD AUTO: 40.2 % (ref 37–54)
HGB BLD-MCNC: 13.6 G/DL (ref 12.5–16.5)
LYMPHOCYTES NFR BLD: 2.44 K/UL (ref 1.5–4)
LYMPHOCYTES RELATIVE PERCENT: 29 % (ref 20–42)
MCH RBC QN AUTO: 31.9 PG (ref 26–35)
MCHC RBC AUTO-ENTMCNC: 33.8 G/DL (ref 32–34.5)
MCV RBC AUTO: 94.4 FL (ref 80–99.9)
METAMYELOCYTES ABSOLUTE COUNT: 0.08 K/UL (ref 0–0.12)
METAMYELOCYTES: 1 % (ref 0–1)
MICROORGANISM SPEC CULT: ABNORMAL
MICROORGANISM SPEC CULT: NORMAL
MICROORGANISM SPEC CULT: NORMAL
MICROORGANISM/AGENT SPEC: ABNORMAL
MONOCYTES NFR BLD: 0.42 K/UL (ref 0.1–0.95)
MONOCYTES NFR BLD: 5 % (ref 2–12)
NEUTROPHILS NFR BLD: 58 % (ref 43–80)
NEUTS SEG NFR BLD: 4.87 K/UL (ref 1.8–7.3)
PLATELET # BLD AUTO: 294 K/UL (ref 130–450)
PMV BLD AUTO: 9.7 FL (ref 7–12)
POTASSIUM SERPL-SCNC: 4.1 MMOL/L (ref 3.5–5)
PROT SERPL-MCNC: 6.2 G/DL (ref 6.4–8.3)
RBC # BLD AUTO: 4.26 M/UL (ref 3.8–5.8)
RBC # BLD: ABNORMAL 10*6/UL
SERVICE CMNT-IMP: ABNORMAL
SERVICE CMNT-IMP: NORMAL
SERVICE CMNT-IMP: NORMAL
SODIUM SERPL-SCNC: 138 MMOL/L (ref 132–146)
SPECIMEN DESCRIPTION: ABNORMAL
SPECIMEN DESCRIPTION: NORMAL
SPECIMEN DESCRIPTION: NORMAL
WBC OTHER # BLD: 8.4 K/UL (ref 4.5–11.5)

## 2024-08-22 PROCEDURE — 6360000002 HC RX W HCPCS: Performed by: UROLOGY

## 2024-08-22 PROCEDURE — 85025 COMPLETE CBC W/AUTO DIFF WBC: CPT

## 2024-08-22 PROCEDURE — 6370000000 HC RX 637 (ALT 250 FOR IP): Performed by: NURSE PRACTITIONER

## 2024-08-22 PROCEDURE — 6370000000 HC RX 637 (ALT 250 FOR IP): Performed by: UROLOGY

## 2024-08-22 PROCEDURE — 36415 COLL VENOUS BLD VENIPUNCTURE: CPT

## 2024-08-22 PROCEDURE — 2580000003 HC RX 258: Performed by: UROLOGY

## 2024-08-22 PROCEDURE — 80053 COMPREHEN METABOLIC PANEL: CPT

## 2024-08-22 RX ORDER — HYDROCODONE BITARTRATE AND ACETAMINOPHEN 5; 325 MG/1; MG/1
1 TABLET ORAL EVERY 8 HOURS PRN
Qty: 10 TABLET | Refills: 0 | Status: SHIPPED | OUTPATIENT
Start: 2024-08-22 | End: 2024-08-29

## 2024-08-22 RX ORDER — AMOXICILLIN AND CLAVULANATE POTASSIUM 562.5; 437.5; 62.5 MG/1; MG/1; MG/1
2 TABLET, MULTILAYER, EXTENDED RELEASE ORAL EVERY 12 HOURS SCHEDULED
Qty: 56 TABLET | Refills: 0 | Status: SHIPPED | OUTPATIENT
Start: 2024-08-22 | End: 2024-09-05

## 2024-08-22 RX ORDER — CEPHALEXIN 500 MG/1
500 CAPSULE ORAL 3 TIMES DAILY
Qty: 21 CAPSULE | Refills: 0 | Status: SHIPPED | OUTPATIENT
Start: 2024-08-22 | End: 2024-08-22 | Stop reason: HOSPADM

## 2024-08-22 RX ORDER — AMOXICILLIN AND CLAVULANATE POTASSIUM 562.5; 437.5; 62.5 MG/1; MG/1; MG/1
2 TABLET, MULTILAYER, EXTENDED RELEASE ORAL EVERY 12 HOURS SCHEDULED
Status: DISCONTINUED | OUTPATIENT
Start: 2024-08-22 | End: 2024-08-22 | Stop reason: HOSPADM

## 2024-08-22 RX ADMIN — ALLOPURINOL 300 MG: 300 TABLET ORAL at 08:51

## 2024-08-22 RX ADMIN — ENOXAPARIN SODIUM 40 MG: 100 INJECTION SUBCUTANEOUS at 08:51

## 2024-08-22 RX ADMIN — AMPICILLIN SODIUM AND SULBACTAM SODIUM 3000 MG: 2; 1 INJECTION, POWDER, FOR SOLUTION INTRAMUSCULAR; INTRAVENOUS at 00:09

## 2024-08-22 RX ADMIN — AMPICILLIN SODIUM AND SULBACTAM SODIUM 3000 MG: 2; 1 INJECTION, POWDER, FOR SOLUTION INTRAMUSCULAR; INTRAVENOUS at 06:14

## 2024-08-22 RX ADMIN — PANTOPRAZOLE SODIUM 40 MG: 40 TABLET, DELAYED RELEASE ORAL at 06:11

## 2024-08-22 RX ADMIN — MICONAZOLE NITRATE: 20 CREAM TOPICAL at 08:50

## 2024-08-22 RX ADMIN — AMOXICILLIN AND CLAVULANATE POTASSIUM 2 TABLET: 562.5; 437.5; 62.5 TABLET, MULTILAYER, EXTENDED RELEASE ORAL at 12:13

## 2024-08-22 RX ADMIN — LINEZOLID 600 MG: 600 TABLET, FILM COATED ORAL at 08:51

## 2024-08-22 RX ADMIN — ACETAMINOPHEN 500 MG: 500 TABLET ORAL at 09:06

## 2024-08-22 RX ADMIN — CARVEDILOL 12.5 MG: 6.25 TABLET, FILM COATED ORAL at 08:51

## 2024-08-22 RX ADMIN — SACUBITRIL AND VALSARTAN 1 TABLET: 49; 51 TABLET, FILM COATED ORAL at 08:51

## 2024-08-22 RX ADMIN — SPIRONOLACTONE 25 MG: 25 TABLET ORAL at 08:52

## 2024-08-22 ASSESSMENT — PAIN DESCRIPTION - LOCATION: LOCATION: GROIN;SCROTUM

## 2024-08-22 ASSESSMENT — PAIN SCALES - GENERAL: PAINLEVEL_OUTOF10: 0

## 2024-08-22 ASSESSMENT — PAIN DESCRIPTION - ORIENTATION: ORIENTATION: RIGHT

## 2024-08-22 ASSESSMENT — PAIN DESCRIPTION - DESCRIPTORS: DESCRIPTORS: ACHING;DISCOMFORT;SORE

## 2024-08-22 NOTE — PROGRESS NOTES
CLINICAL PHARMACY NOTE: MEDS TO BEDS    Total # of Prescriptions Filled: 2   The following medications were delivered to the patient:  Norco 5/ 325 mg  Amoxicillin-pot 1000-62.5    Additional Documentation:

## 2024-08-22 NOTE — PROGRESS NOTES
Revisit for wife lesson  Wife cleansed and changed dressings/packing.with help and guidance Reviewed  procedure  Supplies in room  Plan for home today with home care  Area less red today. Moderate amount thick yellow from scrotal/perineal wound  No odor  Denisa Pinon, CNS, Wound Care

## 2024-08-22 NOTE — PROGRESS NOTES
Subjective:    The patient is awake and alert.  No problems overnight.  Denies chest pain, angina, and dyspnea.  Denies abdominal pain.  Tolerating diet.  No nausea or vomiting. No fever or chills. Pain is controlled. On linezolid and unasyn per ID. S/p right groin and perineal I&D per urology    Objective:    /87   Pulse 69   Temp 97.7 °F (36.5 °C) (Oral)   Resp 18   Ht 1.753 m (5' 9\")   Wt 101.9 kg (224 lb 10.4 oz)   SpO2 96%   BMI 33.17 kg/m²   Neck: No goiter, bruit, or LA  Heart:  RRR, no murmurs, gallops, or rubs.  Lungs:  CTA bilaterally, no wheeze, rales or rhonchi  Abd: bowel sounds present, nontender, nondistended, no masses  Extrem:  No clubbing, cyanosis, or edema, 2+ peripheral pulses, FROM  Right groin is less red, less swollen, less firm, Perineum overall less swollen. Evidence of bloody drainage from these areas of I&D in right groin and perineum    CBC:   Lab Results   Component Value Date/Time    WBC 14.9 08/21/2024 03:15 AM    RBC 4.73 08/21/2024 03:15 AM    HGB 14.9 08/21/2024 03:15 AM    HCT 45.4 08/21/2024 03:15 AM    MCV 96.0 08/21/2024 03:15 AM    MCH 31.5 08/21/2024 03:15 AM    MCHC 32.8 08/21/2024 03:15 AM    RDW 12.7 08/21/2024 03:15 AM     08/21/2024 03:15 AM    MPV 9.9 08/21/2024 03:15 AM     CMP:    Lab Results   Component Value Date/Time     08/21/2024 03:15 AM    K 4.6 08/21/2024 03:15 AM     08/21/2024 03:15 AM    CO2 22 08/21/2024 03:15 AM    BUN 16 08/21/2024 03:15 AM    CREATININE 1.0 08/21/2024 03:15 AM    LABGLOM 79 08/21/2024 03:15 AM    LABGLOM 54 04/01/2024 10:45 AM    GLUCOSE 129 08/21/2024 03:15 AM    CALCIUM 9.6 08/21/2024 03:15 AM    BILITOT 0.2 08/21/2024 03:15 AM    ALKPHOS 92 08/21/2024 03:15 AM    AST 21 08/21/2024 03:15 AM    ALT 32 08/21/2024 03:15 AM          Current Facility-Administered Medications:     oxyCODONE-acetaminophen (PERCOCET) 5-325 MG per tablet 1 tablet, 1 tablet, Oral, Q4H PRN, Carmencita Guerrero, APRN - CNP, 1 tablet

## 2024-08-22 NOTE — PROGRESS NOTES
8/22/2024 9:50 AM  Service: Urology  Group: [facility] ([facility])    Les Romero  [id number]    Subjective: Afebrile  Patient has some mild discomfort not a high amount of drainage from the incision site.  Seems to be voiding well he is ambulatory    Review of Systems  Respiratory: negative  Cardiovascular: negative  Gastrointestinal: negative  Hematologic/lymphatic: negative  Musculoskeletal:negative  Neurological: negative  Endocrine: negative    Scheduled Meds:   enoxaparin  40 mg SubCUTAneous Daily    carvedilol  12.5 mg Oral BID WC    sacubitril-valsartan  1 tablet Oral BID    spironolactone  25 mg Oral Daily    allopurinol  300 mg Oral Daily    pantoprazole  40 mg Oral QAM AC    linezolid  600 mg Oral 2 times per day    ampicillin-sulbactam  3,000 mg IntraVENous Q6H    miconazole   Topical BID       Objective:  Vitals:    08/22/24 0851   BP: (!) 143/82   Pulse: 74   Resp:    Temp:    SpO2:          Allergies: Ace inhibitors    General Appearance: alert and oriented to person, place and time and in no acute distress  Skin: no rash or erythema  Head: normocephalic and atraumatic  Pulmonary/Chest: clear to auscultation bilaterally- no wheezes, rales or rhonchi, normal air movement, no respiratory distress and no chest wall tenderness  Abdomen: soft, non-tender, non-distended, normal bowel sounds, no masses or organomegaly and no inguinal adenopathy  Genitalia: The packing is still intact.  There is minimal swelling and no tenderness of the right hemiscrotum.  There is mild induration at the penoscrotal junction. Extremities: no cyanosis, clubbing or edema and Monroe's sign negative bilaterally      Labs:     Recent Labs     08/21/24  0315      K 4.6      CO2 22   BUN 16   CREATININE 1.0   GLUCOSE 129*   CALCIUM 9.6       Lab Results   Component Value Date/Time    HGB 14.9 08/21/2024 03:15 AM    HCT 45.4 08/21/2024 03:15 AM         Assessment:  Les Romero 69 y.o. male     Principal

## 2024-08-22 NOTE — CARE COORDINATION
8/22/2024  Social Work Discharge Planning: Plan is home with spouse.Referral was made to [facility] and they are following. [facility] for wound care and possible IV ATB-but likely PO.  Order will be needed. Electronically signed by CLEM Price on 8/22/2024 at 10:00 AM

## 2024-08-22 NOTE — PROGRESS NOTES
[facility]  [facility]  Progress Note    SUBJECTIVE:  Chief Complaint   Patient presents with    Groin Swelling     Redness and swelling to testicle and scrotum, onset last Thursday. Denies fever/chills.      Patient is tolerating medications. No reported adverse drug reactions.  No nausea, vomiting, diarrhea.  Some improvement in pain an swelling    Review of systems:  As stated above in the chief complaint, otherwise negative.    Medications:  Scheduled Meds:   enoxaparin  40 mg SubCUTAneous Daily    carvedilol  12.5 mg Oral BID     sacubitril-valsartan  1 tablet Oral BID    spironolactone  25 mg Oral Daily    allopurinol  300 mg Oral Daily    pantoprazole  40 mg Oral QAM AC    linezolid  600 mg Oral 2 times per day    ampicillin-sulbactam  3,000 mg IntraVENous Q6H    miconazole   Topical BID     Continuous Infusions:   dextrose       PRN Meds:oxyCODONE-acetaminophen, glucose, dextrose bolus **OR** dextrose bolus, glucagon (rDNA), dextrose, acetaminophen    OBJECTIVE:  BP (!) 143/82   Pulse 74   Temp 97.6 °F (36.4 °C) (Oral)   Resp 18   Ht 1.753 m (5' 9\")   Wt 101.9 kg (224 lb 10.4 oz)   SpO2 96%   BMI 33.17 kg/m²   Temp  Av.7 °F (36.5 °C)  Min: 97.6 °F (36.4 °C)  Max: 97.7 °F (36.5 °C)  Constitutional: The patient is awake, alert, and oriented.   Skin: Warm and dry. No rashes were noted.   HEENT: Round and reactive pupils.  Moist mucous membranes.  No ulcerations or thrush.  Neck: Supple to movements.   Chest: No use of accessory muscles to breathe. Symmetrical expansion.  No wheezing, crackles or rhonchi.  Cardiovascular: S1 and S2 are rhythmic and regular. No murmurs appreciated.   Abdomen: Positive bowel sounds to auscultation. Benign to palpation. No masses felt. No hepatosplenomegaly.  Groin and perineum are dressed  Extremities: No clubbing, no cyanosis, no edema.  Lines: Peripheral.        S/s drainage  Laboratory and Tests:  Lab Results   Component Value Date     .0 (H) 08/18/2024     Lab Results   Component Value Date    SEDRATE 46 (H) 08/18/2024       Radiology:  Reviewed     Microbiology:   Surgical culture 8/20: MSSA  Wound culture 8/19: MSSA, Corynebacterium, Morganella morganii (light growth)     No results for input(s): \"PROCAL\" in the last 72 hours.      ASSESSMENT:  Perineal abscess and right groin abscess, status post incision and drainage 8/20  Cellulitis right inguinal and pubic area secondary to perineal abscess  Leukocytosis associated to the above  Doubt epididymoorchitis  Inguinal intertrigo    PLAN:  Stop Unasyn and Linezolid   Switch to Augmentin XR for discharge    Lars Jerry, APRN - CNP  9:12 AM  8/22/2024    Patient seen and examined. I had a face to face encounter with the patient. Agree with exam.  Assessment and plan as outlined above and directed by me. Addition and corrections were done as deemed appropriate. My exam and plan include: Patient underwent debridement.  He is feeling better.  Cultures growing MSSA, Corynebacterium and Morganella.  Antibiotics will be consolidated to Augmentin XR.  He can be discharged from ID standpoint.  Follow-up with surgery and PCP.    Cong Wilson MD  8/22/2024  2:14 PM

## 2024-08-22 NOTE — ACP (ADVANCE CARE PLANNING)
8/22/2024  Advance Care Planning   Healthcare Decision Maker:  Jordyn Romero [phone]    Click here to complete Healthcare Decision Makers including selection of the Healthcare Decision Maker Relationship (ie \"Primary\").

## 2024-08-22 NOTE — DISCHARGE SUMMARY
Physician Discharge Summary     Patient ID:  Les Romero  [id number]  69 y.o.  1955    Admit date: 8/18/2024    Discharge date and time: 8/22/2024    Admitting Physician: Les Mendez MD     Admission Diagnoses: Epididymo-orchitis [N45.3]    Discharge Diagnoses: Right penoscrotal abscess    Hospital Course: Was admitted for hydration pain management and surgical management of that    Treatments: Incision and drainage and packing of the abscess on 8/21/2024    Disposition: Home  Condition stable  Patient Instructions:   Current Discharge Medication List        START taking these medications    Details   cephALEXin (KEFLEX) 500 MG capsule Take 1 capsule by mouth 3 times daily for 7 days  Qty: 21 capsule, Refills: 0      HYDROcodone-acetaminophen (NORCO) 5-325 MG per tablet Take 1 tablet by mouth every 8 hours as needed for Pain for up to 10 doses. Max Daily Amount: 3 tablets  Qty: 10 tablet, Refills: 0    Comments: Reduce doses taken as pain becomes manageable Reduce doses taken as pain becomes manageable  Associated Diagnoses: Scrotal abscess           CONTINUE these medications which have NOT CHANGED    Details   !! fluticasone (FLONASE) 50 MCG/ACT nasal spray 1 spray by Each Nostril route daily      loratadine (CLARITIN) 10 MG capsule Take 1 capsule by mouth daily      !! fluticasone (FLONASE) 50 MCG/ACT nasal spray 2 sprays by Each Nostril route daily  Qty: 16 g, Refills: 5      carvedilol (COREG) 12.5 MG tablet Take 1 tablet by mouth 2 times daily  Qty: 60 tablet, Refills: 5      famotidine (PEPCID) 20 MG tablet Take 1 tablet by mouth 2 times daily    Associated Diagnoses: NICM (nonischemic cardiomyopathy) (HCC)      empagliflozin (JARDIANCE) 10 MG tablet Take 1 tablet by mouth daily  Qty: 30 tablet, Refills: 5    Associated Diagnoses: NICM (nonischemic cardiomyopathy) (HCC)      spironolactone (ALDACTONE) 25 MG tablet Take 1 tablet by mouth daily  Qty: 30 tablet, Refills: 5      allopurinol (ZYLOPRIM)

## 2024-08-23 LAB
MICROORGANISM SPEC CULT: NORMAL
MICROORGANISM/AGENT SPEC: NORMAL
SERVICE CMNT-IMP: NORMAL
SPECIMEN DESCRIPTION: NORMAL

## 2024-08-24 NOTE — DISCHARGE SUMMARY
[place] [address]                            DISCHARGE SUMMARY      PATIENT NAME: LES ARECHIGA               : 1955  MED REC NO: [id number]                        ROOM: [id number]  ACCOUNT NO: [id number]                       ADMIT DATE: 2024  PROVIDER: Les Mendez MD      FINAL DIAGNOSES:    1. Perineal abscess with extension into right groin.  2. Dilated cardiomyopathy.  3. Chronic systolic congestive heart failure.  4. Chronic kidney disease stage 3A.    COURSE OF ILLNESS:  This is a 69-year-old gentleman who developed a pimple like lesion at the base of his scrotum.  He attempted to squeeze it.  Sometime thereafter, he developed increasing swelling, redness, and ultimately bloody drainage from the base of his scrotum.  The redness and swelling extended up into his right groin area.  He was seen by Infectious Disease and diagnosed with a perineal abscess with extension to his right groin.  CAT scan showed significant right inguinal lymphadenopathy and evidence of abscess in these areas.  He ultimately underwent I and D by Urology.  Incisions were made both at the base of his scrotum and in his right inguinal area with drainage of obvious purulence.  Culture of this purulent material showed a methicillin-sensitive Staph aureus.  The patient had an elevated white count, but did not have any elevation of lactic acid or any fever or any other signs of sepsis.  Initially, his creatinine was 1.3 which is near his baseline.  This did improve throughout the admission to around 1.1.  He was instructed to discontinue his Jardiance given his genitourinary infection and cellulitis.  After he was placed on IV antibiotics throughout the admission with Unasyn and was on oral linezolid as well per ID service.  After successful I and D and good initial results with antibiotics, both Infectious Disease and Urology felt that  the patient could be managed at home with oral antibiotics.  He was switched to Augmentin and discharged home.  The swelling and the redness in his scrotum and perineal area and right groin had notably improved.  The patient was sent home in a stable medical condition with a need for additional 2 weeks of oral antibiotics.  He will need close followup as an outpatient for both myself and Infectious Disease Services and Urology.    DISCHARGE MEDICATIONS:   As follows; Augmentin XR 1000/62.5 two tablets b.i.d. x14 days, Norco 5/325 q.8 hours p.r.n., torsemide 10 mg daily, allopurinol 300 mg daily, aspirin 81 mg daily, carvedilol 12.5 mg b.i.d., spironolactone 25 mg daily, Entresto 49/51 one tablet b.i.d., and famotidine 20 mg daily.  Again, his Jardiance was discontinued.    The patient will be seen in my office in approximately 1 week.  The patient will also schedule outpatient followup with Urology and Infectious Disease.          GRISELDA MOORE MD      D:  08/23/2024 06:33:56     T:  08/24/2024 02:59:01     YOEL/ANGELO  Job #:  [id number]     Doc#:  [id number]

## 2024-08-24 NOTE — PROGRESS NOTES
Physician Progress Note      PATIENT:               LES ARECHIGA  [facility] #:                  [id number]  :                       1955  ADMIT DATE:       2024 4:29 PM  DISCH DATE:        2024 3:11 PM  RESPONDING  PROVIDER #:        Les Mendez MD          QUERY TEXT:    Pt admitted with epididymal orchitis. Pt noted to have WBC 15.8, C-Reactive   Protein 180, Procalcitonin (ng/mL) 0.15. If possible, please document in the   progress notes and discharge summary if you are evaluating and /or treating   any of the following:  The medical record reflects the following:  Risk Factors: Perineal abscess, Age 69    Clinical Indicators: HISTORY   The patient does have the scrotal abscess,   likely stemming from the underlying epididymal orchitis  WBC-13.9,15.8  BP -82/57,104/68,94/62  C-Reactive Protein (mg/L) 180.0H on   Procalcitonin (ng/mL)   0.15H on   Sedimentation Rate (mm/Hr)  46H on     Treatment: IV Vancomycin, IV Zosyn, Infectious Consulted    Thank You  Andrew Anaya [facility]S  Options provided:  -- Sepsis due to epididymal orchitis, present on admission  -- epididymal orchitis without Sepsis  -- Other - I will add my own diagnosis  -- Disagree - Not applicable / Not valid  -- Disagree - Clinically unable to determine / Unknown  -- Refer to Clinical Documentation Reviewer    PROVIDER RESPONSE TEXT:    Pt had abscesses in perineum and right groin associated with right groin   cellulitis. No sepsis.    Query created by: Andrew Anaya on 2024 1:56 AM      Electronically signed by:  Les Mendez MD 2024 6:43 AM

## 2024-08-26 ENCOUNTER — APPOINTMENT (OUTPATIENT)
Dept: AUDIOLOGY | Facility: CLINIC | Age: 69
End: 2024-08-26
Payer: COMMERCIAL

## 2024-08-26 ENCOUNTER — APPOINTMENT (OUTPATIENT)
Dept: OTOLARYNGOLOGY | Facility: CLINIC | Age: 69
End: 2024-08-26
Payer: COMMERCIAL

## 2024-09-19 LAB
MICROORGANISM SPEC CULT: NORMAL
MICROORGANISM SPEC CULT: NORMAL
MICROORGANISM/AGENT SPEC: NORMAL
MICROORGANISM/AGENT SPEC: NORMAL
SERVICE CMNT-IMP: NORMAL
SERVICE CMNT-IMP: NORMAL
SPECIMEN DESCRIPTION: NORMAL
SPECIMEN DESCRIPTION: NORMAL

## 2024-09-23 ENCOUNTER — APPOINTMENT (OUTPATIENT)
Dept: AUDIOLOGY | Facility: CLINIC | Age: 69
End: 2024-09-23
Payer: COMMERCIAL

## 2024-09-23 ENCOUNTER — APPOINTMENT (OUTPATIENT)
Dept: OTOLARYNGOLOGY | Facility: CLINIC | Age: 69
End: 2024-09-23
Payer: COMMERCIAL

## 2024-09-27 NOTE — PROGRESS NOTES
"        Reason for Consult:  CI candidacy      Subjective   History Of Present Illness:  Jayant Gallo is a 69 y.o. 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 He has a signifiant history of bilateral hearing loss, with the right ear being his worst hearing ear. He does wear bilateral hearing aids but these are no longer providing enough benefit for him. He has significant difficulties understanding speech and communicating with others. He was evaluated by Dr. Kaur, who did an MRI in the past ruled out retrocochlear pathology. He was referred to me for evaluation and treatment        Past Medical History:  He has no past medical history on file.    Surgical History:  He has no past surgical history on file.     Social History:  He reports that he has never smoked. He has never used smokeless tobacco. No history on file for alcohol use and drug use.    Family History:  family history is not on file.     Medications:  Current Outpatient Medications   Medication Instructions    allopurinol (Zyloprim) 300 mg tablet 1 tablet, oral, Daily RT    aspirin 81 mg EC tablet 1 tablet, oral, Daily RT    carvedilol (COREG) 18.7 mg, oral, 2 times daily    Entresto 49-51 mg tablet 1 tablet, oral    famotidine (Pepcid) 20 mg tablet 1 tablet, oral, 2 times daily    fluticasone (Flonase) 50 mcg/actuation nasal spray 2 sprays, Each Nostril, Daily    loratadine 10 mg capsule 1 capsule, oral, Daily      Allergies:  Patient has no known allergies.    Review of Systems:   A comprehensive 10-point review of systems was obtained including constitutional, neurological, HEENT, pulmonary, cardiovascular, genito-urinary, and other pertinent systems and was negative except as noted in the HPI.     Objective   Physical Exam:  Last Recorded Vitals: Height 1.753 m (5' 9\"), weight 97.5 kg (215 lb).    On physical exam, the patient is a well-nourished, well-developed patient, in no acute distress, able " to communicate without assistance in English language. Head and face is atraumatic and normocephalic. Salivary glands are intact. Facial strength is symmetrical bilaterally.       On ear examination:  Right ear: The patient has cerumen impaction which was removed. Upon removal, it was revealed that the tympanic membrane is intact.  AC>BC  Left ear: The patient has cerumen impaction which was removed. Upon removal, it was revealed that the tympanic membrane is intact.  AC>BC  The Monroy was not done as he cannot discern sound    On vestibular exam, the patient has no spontaneous nystagmus, no headshake nystagmus, no head-thrust nystagmus, and no nystagmus on hyperventilation or Valsalva maneuvers. [place]-Hallpike maneuver is negative bilaterally.       On neuro exam, the patient is alert and oriented x3, cranial nerves are grossly intact, cerebellar exam is normal.      The rest of the exam, including anterior rhinoscopy, oropharyngeal exam, neck exam, and cardiovascular exam, were normal including no palpable lymphadenopathies, thyroid in the midline position, normal pulses, and normal chest excursion.       Reviewed Results:  Audiology Testing:  Cochlear Implant Evaluation:  I reviewed the preop CI evaluation from 09/2024 that showed:  Right: Hearing in Quiet:  - CNC: 12%    - Azbio: 21%      Hearing in Noise:   - Azbio @ +10SNR: 0%  - Azbio @ +5SNR : DNT        Left: Hearing in Quiet:  - CNC: 44%    - Azbio: 93%      Hearing in Noise:   - Azbio @ +10SNR: 45%  - Azbio @ +5SNR : DNT      I personally reviewed his audiogram from 9/2023 that showed:    Right ear: Severe sensorineural hearing loss . Discrimination: 16%   Left ear: moderate to severe sensorineural hearing loss . Discrimination: 68%.    Imaging:  I personally reviewed the MRI of the temporal bone from 06/25/24 that showed: no evidence of retrochoclear pathology, present cochlear nerves and patent cochlea.     Procedure:  None    Assessment/Plan     1.  Sensorineural hearing loss (SNHL) of both ears    2. Abnormal auditory perception of both ears    3. Cochlear implant follow-up    4. Pre-op testing    5. Bilateral impacted cerumen        In summary, Jayant Gallo is a 69 y.o. male with with significant history of bilateral sensorineural hearing loss, moderate to severe levels. He wears bilateral hearing aids, but these are not providing enough benefit. His right ear is his worst hearing ear. He does meet the FDA requirements for CI.     The risks, indications and complications of a right sided CI were discussed with the patient today. He elected to proceed. This will be scheduled in the near future.     He will need device selection, cognitive evaluation, pneumoccocal vaccinaion, CT scan.    I will see him back  virtually after he has completed these appointents.      Scribe Attestation  By signing my name below, I, Precious Sher , Scribe   attest that this documentation has been prepared under the direction and in the presence of Jason Garibay MD.   ____________________________________________________  Jason Valles MD  Professor and Chief   Otology/Neurotology/Lateral Skull-Base Surgery   [facility]

## 2024-09-30 ENCOUNTER — CLINICAL SUPPORT (OUTPATIENT)
Dept: AUDIOLOGY | Facility: CLINIC | Age: 69
End: 2024-09-30
Payer: COMMERCIAL

## 2024-09-30 ENCOUNTER — APPOINTMENT (OUTPATIENT)
Dept: OTOLARYNGOLOGY | Facility: CLINIC | Age: 69
End: 2024-09-30
Payer: COMMERCIAL

## 2024-09-30 ENCOUNTER — HOSPITAL ENCOUNTER (OUTPATIENT)
Facility: HOSPITAL | Age: 69
Setting detail: OUTPATIENT SURGERY
End: 2024-09-30
Attending: OTOLARYNGOLOGY | Admitting: OTOLARYNGOLOGY
Payer: COMMERCIAL

## 2024-09-30 VITALS — BODY MASS INDEX: 31.84 KG/M2 | HEIGHT: 69 IN | WEIGHT: 215 LBS

## 2024-09-30 DIAGNOSIS — H61.23 BILATERAL IMPACTED CERUMEN: ICD-10-CM

## 2024-09-30 DIAGNOSIS — Z01.818 PRE-OP TESTING: ICD-10-CM

## 2024-09-30 DIAGNOSIS — H90.6 MIXED CONDUCTIVE AND SENSORINEURAL HEARING LOSS OF BOTH EARS: Primary | ICD-10-CM

## 2024-09-30 DIAGNOSIS — Z96.21 COCHLEAR IMPLANT FOLLOW-UP: ICD-10-CM

## 2024-09-30 DIAGNOSIS — Z48.89 COCHLEAR IMPLANT FOLLOW-UP: ICD-10-CM

## 2024-09-30 DIAGNOSIS — Z96.21 COCHLEAR IMPLANT STATUS: ICD-10-CM

## 2024-09-30 DIAGNOSIS — H93.293 ABNORMAL AUDITORY PERCEPTION OF BOTH EARS: ICD-10-CM

## 2024-09-30 DIAGNOSIS — H90.3 SENSORINEURAL HEARING LOSS (SNHL) OF BOTH EARS: Primary | ICD-10-CM

## 2024-09-30 PROCEDURE — 92550 TYMPANOMETRY & REFLEX THRESH: CPT | Performed by: AUDIOLOGIST

## 2024-09-30 PROCEDURE — 99204 OFFICE O/P NEW MOD 45 MIN: CPT | Performed by: OTOLARYNGOLOGY

## 2024-09-30 PROCEDURE — 69210 REMOVE IMPACTED EAR WAX UNI: CPT | Performed by: OTOLARYNGOLOGY

## 2024-09-30 PROCEDURE — 92557 COMPREHENSIVE HEARING TEST: CPT | Performed by: AUDIOLOGIST

## 2024-09-30 PROCEDURE — 1160F RVW MEDS BY RX/DR IN RCRD: CPT | Performed by: OTOLARYNGOLOGY

## 2024-09-30 PROCEDURE — 92626 EVAL AUD FUNCJ 1ST HOUR: CPT | Performed by: AUDIOLOGIST

## 2024-09-30 PROCEDURE — 3008F BODY MASS INDEX DOCD: CPT | Performed by: OTOLARYNGOLOGY

## 2024-09-30 PROCEDURE — 1159F MED LIST DOCD IN RCRD: CPT | Performed by: OTOLARYNGOLOGY

## 2024-09-30 RX ORDER — SODIUM CHLORIDE, SODIUM LACTATE, POTASSIUM CHLORIDE, CALCIUM CHLORIDE 600; 310; 30; 20 MG/100ML; MG/100ML; MG/100ML; MG/100ML
20 INJECTION, SOLUTION INTRAVENOUS CONTINUOUS
OUTPATIENT
Start: 2024-09-30

## 2024-09-30 RX ORDER — CARVEDILOL 12.5 MG/1
18.7 TABLET ORAL 2 TIMES DAILY
COMMUNITY
Start: 2024-06-26

## 2024-09-30 RX ORDER — ALLOPURINOL 300 MG/1
1 TABLET ORAL
COMMUNITY
Start: 2023-08-21

## 2024-09-30 RX ORDER — FAMOTIDINE 20 MG/1
1 TABLET, FILM COATED ORAL 2 TIMES DAILY
COMMUNITY

## 2024-09-30 RX ORDER — SACUBITRIL AND VALSARTAN 49; 51 MG/1; MG/1
1 TABLET, FILM COATED ORAL
COMMUNITY
Start: 2024-02-12

## 2024-09-30 RX ORDER — ASPIRIN 81 MG/1
1 TABLET ORAL
COMMUNITY

## 2024-09-30 RX ORDER — CEFAZOLIN SODIUM 2 G/100ML
2 INJECTION, SOLUTION INTRAVENOUS ONCE
OUTPATIENT
Start: 2024-09-30 | End: 2024-09-30

## 2024-09-30 RX ORDER — FLUTICASONE PROPIONATE 50 MCG
2 SPRAY, SUSPENSION (ML) NASAL DAILY
COMMUNITY
Start: 2024-04-09

## 2024-09-30 ASSESSMENT — PATIENT HEALTH QUESTIONNAIRE - PHQ9
2. FEELING DOWN, DEPRESSED OR HOPELESS: NOT AT ALL
1. LITTLE INTEREST OR PLEASURE IN DOING THINGS: NOT AT ALL
SUM OF ALL RESPONSES TO PHQ9 QUESTIONS 1 AND 2: 0

## 2024-09-30 NOTE — LETTER
October 1, 2024     Amador Kaur DO  [address] [address]    Patient: Jayant Gallo   YOB: 1955   Date of Visit: 9/30/2024       Dear Dr. Amador Kaur, :    Thank you for referring Jayant Gallo to me for evaluation. Below are my notes for this consultation.  If you have questions, please do not hesitate to call me. I look forward to following your patient along with you.       Sincerely,     Jason Garibay MD      CC: MD Celia Duff, AuD  ______________________________________________________________________________________            Reason for Consult:  CI candidacy      Subjective  History Of Present Illness:  Jayant Gallo is a 69 y.o. 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 He has a signifiant history of bilateral hearing loss, with the right ear being his worst hearing ear. He does wear bilateral hearing aids but these are no longer providing enough benefit for him. He has significant difficulties understanding speech and communicating with others. He was evaluated by Dr. Kaur, who did an MRI in the past ruled out retrocochlear pathology. He was referred to me for evaluation and treatment        Past Medical History:  He has no past medical history on file.    Surgical History:  He has no past surgical history on file.     Social History:  He reports that he has never smoked. He has never used smokeless tobacco. No history on file for alcohol use and drug use.    Family History:  family history is not on file.     Medications:  Current Outpatient Medications   Medication Instructions   • allopurinol (Zyloprim) 300 mg tablet 1 tablet, oral, Daily RT   • aspirin 81 mg EC tablet 1 tablet, oral, Daily RT   • carvedilol (COREG) 18.7 mg, oral, 2 times daily   • Entresto 49-51 mg tablet 1 tablet, oral   • famotidine (Pepcid) 20 mg tablet 1 tablet, oral, 2  "times daily   • fluticasone (Flonase) 50 mcg/actuation nasal spray 2 sprays, Each Nostril, Daily   • loratadine 10 mg capsule 1 capsule, oral, Daily      Allergies:  Patient has no known allergies.    Review of Systems:   A comprehensive 10-point review of systems was obtained including constitutional, neurological, HEENT, pulmonary, cardiovascular, genito-urinary, and other pertinent systems and was negative except as noted in the HPI.     Objective  Physical Exam:  Last Recorded Vitals: Height 1.753 m (5' 9\"), weight 97.5 kg (215 lb).    On physical exam, the patient is a well-nourished, well-developed patient, in no acute distress, able to communicate without assistance in English language. Head and face is atraumatic and normocephalic. Salivary glands are intact. Facial strength is symmetrical bilaterally.       On ear examination:  Right ear: The patient has cerumen impaction which was removed. Upon removal, it was revealed that the tympanic membrane is intact.  AC>BC  Left ear: The patient has cerumen impaction which was removed. Upon removal, it was revealed that the tympanic membrane is intact.  AC>BC  The Monroy was not done as he cannot discern sound    On vestibular exam, the patient has no spontaneous nystagmus, no headshake nystagmus, no head-thrust nystagmus, and no nystagmus on hyperventilation or Valsalva maneuvers. [place]-Hallpike maneuver is negative bilaterally.       On neuro exam, the patient is alert and oriented x3, cranial nerves are grossly intact, cerebellar exam is normal.      The rest of the exam, including anterior rhinoscopy, oropharyngeal exam, neck exam, and cardiovascular exam, were normal including no palpable lymphadenopathies, thyroid in the midline position, normal pulses, and normal chest excursion.       Reviewed Results:  Audiology Testing:  Cochlear Implant Evaluation:  I reviewed the preop CI evaluation from 09/2024 that showed:  Right: Hearing in Quiet:  - CNC: 12%    - Azbio: " 21%      Hearing in Noise:   - Azbio @ +10SNR: 0%  - Azbio @ +5SNR : DNT        Left: Hearing in Quiet:  - CNC: 44%    - Azbio: 93%      Hearing in Noise:   - Azbio @ +10SNR: 45%  - Azbio @ +5SNR : DNT      I personally reviewed his audiogram from 9/2023 that showed:    Right ear: Severe sensorineural hearing loss . Discrimination: 16%   Left ear: moderate to severe sensorineural hearing loss . Discrimination: 68%.    Imaging:  I personally reviewed the report from the MRI of the temporal bone from 06/25/24 that showed: no evidence of retrochoclear pathology      Procedure:  None    Assessment/Plan    1. Sensorineural hearing loss (SNHL) of both ears    2. Abnormal auditory perception of both ears    3. Pre-op testing    4. Cochlear implant status    5. Cochlear implant follow-up        In summary, Jayant Gallo is a 69 y.o. male with with significant history of bilateral sensorineural hearing loss, moderate to severe levels. He wears bilateral hearing aids, but these are not providing enough benefit. His right ear is his worst hearing ear. He does meet the FDA requirements for CI.     The risks, indications and complications of a right sided CI were discussed with the patient today. He elected to proceed. This will be scheduled in the near future.     He will need device selection, cognitive evaluation, pneumoccocal vaccinaion, CT scan,   and we will order the MRI from [facility] to be able to review it. I will see him back   virtually after he has completed these appointents.      Scribe Attestation  By signing my name below, I, Marco Johns   attest that this documentation has been prepared under the direction and in the presence of Jason Garibay MD.   ____________________________________________________  Jason Valles MD  Professor and Chief   Otology/Neurotology/Lateral Skull-Base Surgery   [facility]

## 2024-09-30 NOTE — LETTER
October 2, 2024     Jayant Ferrer MD  [address] [place] [id number]    Patient: Jayant Gallo   YOB: 1955   Date of Visit: 9/30/2024       Dear Dr. Jayant Ferrer MD:    Thank you for referring Jayant Gallo to me for evaluation. Below are my notes for this consultation.  If you have questions, please do not hesitate to call me. I look forward to following your patient along with you.       Sincerely,     Eliane Horton, MOUNIKA, CCC-A      CC: No Recipients  ______________________________________________________________________________________    CHIEF COMPLAINT  Cochlear implant evaluation     HISTORY:  Jayant Gallo was seen by the Audiology Department for a Cochlear Implant evaluation and counseling. He presents as a 69-year old male with a reported history of progressive hearing loss that significantly dropped one year ago in October 2023. He was fit with hearing aids at that time and currently wears behind the ear [employer] hearing aids.  Patient reports occasional tinnitus, dizziness. Patient reports a positive history of occupational noise exposure as a . Right ear is reported as his worse ear. Patient struggles with the telephone and with watching television, relying on Bluetooth for hearing on the phone.   Patient and his wife are here today to learn more about cochlear implants and to determine if he is a candidate. Please refer to medical records for significant medical history    RESULTS:  Otoscopy revealed debris in ear canals bilaterally. Immittance testing revealed normal middle ear function bilaterally. Acoustic reflexes were absent at 500-4000 Hz bilaterally.  Responses to pure tone stimuli under insert earphones reveal a severe hearing los in the left and severe to profound hearing loss in the right. Word discrimination is poor bilaterally.   Functional gain testing was performed while patient wore [employer] power BTE behind the  ear hearing aids at each ear. Responses to narrow band noise ranged from 40-55 dBHL for the frequencies of 500-4000 Hz. Word discrimination was assessed using a 25 word CNC  list as well as [employer] sentences presented at 50 dBHL. In the right ear, a score of 12% correct was obtained for CNC  words and a score of 21% correct was obtained for [employer] sentences. In the left ear a score of 44% correct was obtained for CNC words and a score of 93% correct was obtained for [employer] sentences. [employer] sentences were also presented with 40 dBHL competing speech babble. In this condition, a score of 0% correct was obtained for the right ear and a score of 45% was obtained for the left ear. All speech material was presented via recorded text.     The Cochlear Implant Quality of Life-35 (CIQOL-35) Profile was administered today. Patients responses gave the following raw scores and converted scores:  Communication = 29 raw, 45.35 converted  Emotional = 14 raw, 37.16 converted  Entertainment = 13 raw, 38.92 converted  Environment = 13 raw, 38.02 converted  Listening Effort = 8 raw, 20.62 converted  Social = 19 raw, 64.15 converted  CIQOL-10 Global 27 raw, 42.26 converted    DISCUSSION:  Mr. Gallo has a well-documented severe to profound mixed sensorineural hearing loss bilaterally. Due to the lack of benefit from appropriate amplification, he has expressed interest in a cochlear implant.  Testing shows poor sentence and word understanding, even with appropriately programmed hearing aids indicating a severe to profound functional impairment.  The audiologic findings demonstrate that Mr. Gallo has partial residual hearing for tonal stimuli and speech detection with hearing aids, demonstrating that the auditory cranial nerve can be stimulated. However, hearing aids are not strong enough to provide benefit for speech understanding due to the severity of the hearing loss. On this basis, patient is judged to be an audiologic candidate for a  cochlear implant.  Patient/family have been counseled regarding the post-operative cochlear implant protocol and are properly motivated and able to participate in the post cochlear implant rehabilitation program. Reasonable Expectations and Cochlear Implant Enrollment check sheet were discussed and completed with the patient/family. They have been given written and recorded information regarding cochlear implant candidacy and protocol. Physical device examples were demonstrated today. Mr. Gallo is interested in a brown processor.  Final device selection was not completed today, as patient wanted to look over materials. They were encouraged to reach out to  for further support/information.            TREATMENT PLAN:  1. Follow-up with Dr. Valles regarding test results.  2. From an audiologic standpoint, patient is a candidate for a cochlear implant.  3. Further recommendations following surgical consult.      [phone]    Isreal Mosher, CCC-A

## 2024-09-30 NOTE — Clinical Note
Geri: When adults need pneumococcus vaccination, the only vaccine they need is: PREVNAR 20. Not PCV23 (Pneumovax). I also changed the order.

## 2024-09-30 NOTE — Clinical Note
I know you already schedule him for surgery. He needs this completed prior to surgery: Device selection, cognitive evaluation, pneumoccocal vaccination, CT scan, and Geri, you need to order the films from the MRI from [facility] to be able to review before surgery. Geri, I also noticed that you put an order for CI evaluation. I think you put that order in reference to the device selection appt. These are different types of appts and are not interchangeable. We only put CI evals orders to determine candidacy (this patient already had this done on Monday). For Device selection appt, you just need to message Mila and let her know (Mila please correct me if that is not the process). Thanks. ARC

## 2024-10-01 ENCOUNTER — TELEPHONE (OUTPATIENT)
Dept: OTOLARYNGOLOGY | Facility: CLINIC | Age: 69
End: 2024-10-01
Payer: COMMERCIAL

## 2024-10-01 PROBLEM — H61.23 BILATERAL IMPACTED CERUMEN: Status: ACTIVE | Noted: 2024-10-01

## 2024-10-01 NOTE — TELEPHONE ENCOUNTER
Left confidential voice message with update to tentative scheduling of surgery on 12/26. Confirmed that surgery will be performed at the main campus on 12/27 due to patient's cardiac history. Contact information provided for callback with questions.

## 2024-10-02 NOTE — PROGRESS NOTES
CHIEF COMPLAINT  Cochlear implant evaluation     HISTORY:  Jayant Gallo was seen by the Audiology Department for a Cochlear Implant evaluation and counseling. He presents as a 69-year old male with a reported history of progressive hearing loss that significantly dropped one year ago in October 2023. He was fit with hearing aids at that time and currently wears behind the ear [employer] hearing aids.  Patient reports occasional tinnitus, dizziness. Patient reports a positive history of occupational noise exposure as a . Right ear is reported as his worse ear. Patient struggles with the telephone and with watching television, relying on Bluetooth for hearing on the phone.   Patient and his wife are here today to learn more about cochlear implants and to determine if he is a candidate. Please refer to medical records for significant medical history    RESULTS:  Otoscopy revealed debris in ear canals bilaterally. Immittance testing revealed normal middle ear function bilaterally. Acoustic reflexes were absent at 500-4000 Hz bilaterally.  Responses to pure tone stimuli under insert earphones reveal a severe hearing los in the left and severe to profound hearing loss in the right. Word discrimination is poor bilaterally.   Functional gain testing was performed while patient wore clinic [employer] power BTE behind the ear hearing aids at each ear. Responses to narrow band noise ranged from 40-55 dBHL for the frequencies of 500-4000 Hz. Word discrimination was assessed using a 25 word CNC  list as well as [employer] sentences presented at 50 dBHL. In the right ear, a score of 12% correct was obtained for CNC  words and a score of 21% correct was obtained for [employer] sentences. In the left ear a score of 44% correct was obtained for CNC words and a score of 93% correct was obtained for [employer] sentences. [employer] sentences were also presented with 40 dBHL competing speech babble. In this condition, a score of 0%  correct was obtained for the right ear and a score of 45% was obtained for the left ear. All speech material was presented via recorded text.     The Cochlear Implant Quality of Life-35 (CIQOL-35) Profile was administered today. Patients responses gave the following raw scores and converted scores:  Communication = 29 raw, 45.35 converted  Emotional = 14 raw, 37.16 converted  Entertainment = 13 raw, 38.92 converted  Environment = 13 raw, 38.02 converted  Listening Effort = 8 raw, 20.62 converted  Social = 19 raw, 64.15 converted  CIQOL-10 Global 27 raw, 42.26 converted    DISCUSSION:  Mr. Gallo has a well-documented severe to profound mixed sensorineural hearing loss bilaterally. Due to the lack of benefit from appropriate amplification, he has expressed interest in a cochlear implant.  Testing shows poor sentence and word understanding, even with appropriately programmed hearing aids indicating a severe to profound functional impairment.  The audiologic findings demonstrate that Mr. Gallo has partial residual hearing for tonal stimuli and speech detection with hearing aids, demonstrating that the auditory cranial nerve can be stimulated. However, hearing aids are not strong enough to provide benefit for speech understanding due to the severity of the hearing loss. On this basis, patient is judged to be an audiologic candidate for a cochlear implant.  Patient/family have been counseled regarding the post-operative cochlear implant protocol and are properly motivated and able to participate in the post cochlear implant rehabilitation program. Reasonable Expectations and Cochlear Implant Enrollment check sheet were discussed and completed with the patient/family. They have been given written and recorded information regarding cochlear implant candidacy and protocol. Physical device examples were demonstrated today. Mr. Gallo is interested in a brown processor.  Final device selection was not completed today, as  patient wanted to look over materials. They were encouraged to reach out to  for further support/information.            TREATMENT PLAN:  1. Follow-up with Dr. Valles regarding test results.  2. From an audiologic standpoint, patient is a candidate for a cochlear implant.  3. Further recommendations following surgical consult.      [phone]    Isreal Mosher, CCC-A

## 2024-10-25 ENCOUNTER — TELEPHONE (OUTPATIENT)
Dept: OTOLARYNGOLOGY | Facility: CLINIC | Age: 69
End: 2024-10-25
Payer: COMMERCIAL

## 2024-10-25 NOTE — TELEPHONE ENCOUNTER
Left confidential voice message in an attempt to confirm 12/27 surgery date. Contact information provided for callback.

## 2024-11-04 ENCOUNTER — TELEPHONE (OUTPATIENT)
Dept: OTOLARYNGOLOGY | Facility: HOSPITAL | Age: 69
End: 2024-11-04
Payer: COMMERCIAL

## 2024-11-04 NOTE — TELEPHONE ENCOUNTER
Called patients wife and left a message regarding surgery date and location. Asked her to call me back for details

## 2024-11-05 ENCOUNTER — TELEPHONE (OUTPATIENT)
Dept: OTOLARYNGOLOGY | Facility: HOSPITAL | Age: 69
End: 2024-11-05
Payer: COMMERCIAL

## 2024-11-05 NOTE — TELEPHONE ENCOUNTER
Patient called to cancel surgery and all follow up appointments. Patient not ready to proceed with surgery at this time.

## 2024-11-11 ENCOUNTER — APPOINTMENT (OUTPATIENT)
Dept: RADIOLOGY | Facility: CLINIC | Age: 69
End: 2024-11-11
Payer: COMMERCIAL

## 2024-11-11 ENCOUNTER — APPOINTMENT (OUTPATIENT)
Dept: AUDIOLOGY | Facility: CLINIC | Age: 69
End: 2024-11-11
Payer: COMMERCIAL

## 2024-11-11 ENCOUNTER — APPOINTMENT (OUTPATIENT)
Dept: SPEECH THERAPY | Facility: CLINIC | Age: 69
End: 2024-11-11
Payer: COMMERCIAL

## 2024-12-17 ENCOUNTER — OFFICE VISIT (OUTPATIENT)
Dept: ENT CLINIC | Age: 69
End: 2024-12-17
Payer: COMMERCIAL

## 2024-12-17 VITALS
DIASTOLIC BLOOD PRESSURE: 86 MMHG | TEMPERATURE: 98 F | BODY MASS INDEX: 31.93 KG/M2 | HEIGHT: 69 IN | HEART RATE: 92 BPM | SYSTOLIC BLOOD PRESSURE: 130 MMHG | WEIGHT: 215.6 LBS | OXYGEN SATURATION: 92 %

## 2024-12-17 DIAGNOSIS — H91.8X3 ASYMMETRICAL HEARING LOSS: Primary | ICD-10-CM

## 2024-12-17 DIAGNOSIS — H69.93 ETD (EUSTACHIAN TUBE DYSFUNCTION), BILATERAL: ICD-10-CM

## 2024-12-17 DIAGNOSIS — H61.23 BILATERAL IMPACTED CERUMEN: ICD-10-CM

## 2024-12-17 PROCEDURE — 3075F SYST BP GE 130 - 139MM HG: CPT | Performed by: OTOLARYNGOLOGY

## 2024-12-17 PROCEDURE — 69210 REMOVE IMPACTED EAR WAX UNI: CPT | Performed by: OTOLARYNGOLOGY

## 2024-12-17 PROCEDURE — 1123F ACP DISCUSS/DSCN MKR DOCD: CPT | Performed by: OTOLARYNGOLOGY

## 2024-12-17 PROCEDURE — 3079F DIAST BP 80-89 MM HG: CPT | Performed by: OTOLARYNGOLOGY

## 2024-12-17 PROCEDURE — 99213 OFFICE O/P EST LOW 20 MIN: CPT | Performed by: OTOLARYNGOLOGY

## 2024-12-17 PROCEDURE — 69433 CREATE EARDRUM OPENING: CPT | Performed by: OTOLARYNGOLOGY

## 2024-12-17 RX ORDER — FLUTICASONE PROPIONATE 50 MCG
SPRAY, SUSPENSION (ML) NASAL
COMMUNITY

## 2024-12-17 NOTE — PROGRESS NOTES
tip suction until the middle ear space was cleared.    Condition:  Stable.  Patient tolerated procedure well.    Complications:    Cerumen removal      Auditory canal(s) both ears partially obstructed with cerumen.  A microscope was used due to deep impaction of the cerumen. Cerumen was gently removed using soft plastic curette, alligator forceps, suction. Tympanic membranes are intact following the procedure. Auditory canals appear normal.              Assessment:       Diagnosis Orders   1. Asymmetrical hearing loss        2. Bilateral impacted cerumen        3. ETD (Eustachian tube dysfunction), bilateral                   Plan:      Myringotomy placed today on the right.  I will recheck in a few weeks and consider tube at next visit.   Follow up in 2 week(s)    RX given today:

## 2025-01-06 ENCOUNTER — APPOINTMENT (OUTPATIENT)
Dept: OTOLARYNGOLOGY | Facility: CLINIC | Age: 70
End: 2025-01-06
Payer: COMMERCIAL

## 2025-01-14 ENCOUNTER — OFFICE VISIT (OUTPATIENT)
Dept: ENT CLINIC | Age: 70
End: 2025-01-14
Payer: COMMERCIAL

## 2025-01-14 ENCOUNTER — PROCEDURE VISIT (OUTPATIENT)
Dept: AUDIOLOGY | Age: 70
End: 2025-01-14
Payer: COMMERCIAL

## 2025-01-14 VITALS — BODY MASS INDEX: 31.84 KG/M2 | HEIGHT: 69 IN | WEIGHT: 215 LBS

## 2025-01-14 DIAGNOSIS — H69.93 DYSFUNCTION OF BOTH EUSTACHIAN TUBES: Primary | ICD-10-CM

## 2025-01-14 DIAGNOSIS — H61.23 BILATERAL IMPACTED CERUMEN: ICD-10-CM

## 2025-01-14 DIAGNOSIS — H90.3 SENSORINEURAL HEARING LOSS (SNHL) OF BOTH EARS: ICD-10-CM

## 2025-01-14 DIAGNOSIS — H69.93 ETD (EUSTACHIAN TUBE DYSFUNCTION), BILATERAL: Primary | ICD-10-CM

## 2025-01-14 PROCEDURE — 92557 COMPREHENSIVE HEARING TEST: CPT

## 2025-01-14 PROCEDURE — 99214 OFFICE O/P EST MOD 30 MIN: CPT | Performed by: OTOLARYNGOLOGY

## 2025-01-14 PROCEDURE — 69433 CREATE EARDRUM OPENING: CPT | Performed by: OTOLARYNGOLOGY

## 2025-01-14 PROCEDURE — 1123F ACP DISCUSS/DSCN MKR DOCD: CPT | Performed by: OTOLARYNGOLOGY

## 2025-01-14 PROCEDURE — 92567 TYMPANOMETRY: CPT

## 2025-01-14 ASSESSMENT — ENCOUNTER SYMPTOMS
COLOR CHANGE: 0
APNEA: 0
CHEST TIGHTNESS: 0
ABDOMINAL PAIN: 0
DIARRHEA: 0
GASTROINTESTINAL NEGATIVE: 1
EYES NEGATIVE: 1
EYE DISCHARGE: 0
RESPIRATORY NEGATIVE: 1
EYE PAIN: 0
SHORTNESS OF BREATH: 0
SINUS PRESSURE: 0
VOMITING: 0
RHINORRHEA: 0

## 2025-01-14 NOTE — PROGRESS NOTES
Subjective:      Patient ID:  Les Romero is a 69 y.o. male.    HPI:    Patient presents today for recheck myringotomy. Condition has been present for 2 week(s). Pt felt better with the hole in the eardrum, now healed over.       Past Medical History:   Diagnosis Date    Hypertension      Past Surgical History:   Procedure Laterality Date    CARDIAC PROCEDURE N/A 3/1/2024    Left heart cath / coronary angiography performed by Mikel Hennessy MD at [facility]    KIDNEY REMOVAL      SCROTAL SURGERY N/A 2024    INCISION AND DRAINAGE SCROTAL ABSCESS performed by Gurdeep Corcoran MD at [facility] OR    SHOULDER ARTHROPLASTY Right      Family History   Problem Relation Age of Onset    Heart Attack Mother 72    Heart Attack Father 65     Social History     Socioeconomic History    Marital status:      Spouse name: None    Number of children: None    Years of education: None    Highest education level: None   Tobacco Use    Smoking status: Former     Types: Cigars     Quit date: 2024     Years since quittin.9    Smokeless tobacco: Never   Vaping Use    Vaping status: Never Used   Substance and Sexual Activity    Alcohol use: Yes     Comment: occ    Drug use: Never     Social Determinants of Health     Food Insecurity: No Food Insecurity (2024)    Hunger Vital Sign     Worried About Running Out of Food in the Last Year: Never true     Ran Out of Food in the Last Year: Never true   Transportation Needs: No Transportation Needs (2024)    PRAPARE - Transportation     Lack of Transportation (Medical): No     Lack of Transportation (Non-Medical): No   Housing Stability: Low Risk  (2024)    Housing Stability Vital Sign     Unable to Pay for Housing in the Last Year: No     Number of Times Moved in the Last Year: 1     Homeless in the Last Year: No     Allergies   Allergen Reactions    Ace Inhibitors        Review of Systems   Constitutional: Negative.  Negative for appetite change.

## 2025-01-14 NOTE — PROGRESS NOTES
This patient was referred for audiometric and tympanometric testing by Dr. Carbajal due to hearing loss and clogged ears. Patient noticed a drop in his hearing since previous visit.     Audiometry using pure tone air and bone conduction testing revealed a severe to profound sensorineural hearing loss, in the right ear. The left ear revealed moderately-severe to profound sensorineural hearing loss. Reliability was good. Speech reception thresholds were in good agreement with the pure tone averages, bilaterally. Speech discrimination scores were poor (24%), in the right ear and excellent (90%), in the left ear.    Tympanometry revealed normal middle ear peak pressure and compliance, bilaterally.    The results were reviewed with the patient and ordering provider.     Recommendations for follow up will be made pending ordering provider consult.    Daniella Alonzo/CCC-A  [facility] [phone]  Electronically signed by Daniella Alonzo on 1/14/2025 at 1:51 PM

## 2025-01-20 ENCOUNTER — APPOINTMENT (OUTPATIENT)
Dept: OTOLARYNGOLOGY | Facility: CLINIC | Age: 70
End: 2025-01-20
Payer: COMMERCIAL

## 2025-01-20 ENCOUNTER — APPOINTMENT (OUTPATIENT)
Dept: AUDIOLOGY | Facility: CLINIC | Age: 70
End: 2025-01-20
Payer: COMMERCIAL

## 2025-01-28 ENCOUNTER — TELEPHONE (OUTPATIENT)
Dept: ENT CLINIC | Age: 70
End: 2025-01-28

## 2025-01-28 DIAGNOSIS — H69.93 ETD (EUSTACHIAN TUBE DYSFUNCTION), BILATERAL: Primary | ICD-10-CM

## 2025-01-28 RX ORDER — CIPROFLOXACIN AND DEXAMETHASONE 3; 1 MG/ML; MG/ML
3 SUSPENSION/ DROPS AURICULAR (OTIC) 3 TIMES DAILY
Qty: 1 EACH | Refills: 2 | Status: SHIPPED | OUTPATIENT
Start: 2025-01-28 | End: 2025-02-04

## 2025-01-28 NOTE — TELEPHONE ENCOUNTER
Pt had tube placed 01/14/2025 had some bleeding 1 week later. Now has yellow drainage with some dizziness, Pt was using ofloxacin drops. Advised to discontinue and start Ciprodex.

## 2025-02-19 ENCOUNTER — APPOINTMENT (OUTPATIENT)
Dept: AUDIOLOGY | Facility: CLINIC | Age: 70
End: 2025-02-19
Payer: COMMERCIAL

## 2025-03-01 ENCOUNTER — HOSPITAL ENCOUNTER (OUTPATIENT)
Age: 70
Discharge: HOME | End: 2025-03-01
Payer: COMMERCIAL

## 2025-03-01 LAB
ANION GAP SERPL CALCULATED.3IONS-SCNC: 11 MMOL/L (ref 7–16)
BUN SERPL-MCNC: 14 MG/DL (ref 6–23)
CALCIUM SERPL-MCNC: 9.9 MG/DL (ref 8.6–10.2)
CHLORIDE SERPL-SCNC: 102 MMOL/L (ref 98–107)
CO2 SERPL-SCNC: 24 MMOL/L (ref 22–29)
CREAT SERPL-MCNC: 1.3 MG/DL (ref 0.7–1.2)
GFR, ESTIMATED: 59 ML/MIN/1.73M2
GLUCOSE SERPL-MCNC: 100 MG/DL (ref 74–99)
POTASSIUM SERPL-SCNC: 4.7 MMOL/L (ref 3.5–5)
SODIUM SERPL-SCNC: 137 MMOL/L (ref 132–146)
TRIGL SERPL-MCNC: 115 MG/DL

## 2025-03-01 PROCEDURE — 36415 COLL VENOUS BLD VENIPUNCTURE: CPT

## 2025-03-01 PROCEDURE — 84478 ASSAY OF TRIGLYCERIDES: CPT

## 2025-03-01 PROCEDURE — 80048 BASIC METABOLIC PNL TOTAL CA: CPT

## 2025-04-16 ENCOUNTER — APPOINTMENT (OUTPATIENT)
Dept: AUDIOLOGY | Facility: CLINIC | Age: 70
End: 2025-04-16
Payer: COMMERCIAL

## 2026-01-21 ENCOUNTER — APPOINTMENT (OUTPATIENT)
Dept: AUDIOLOGY | Facility: CLINIC | Age: 71
End: 2026-01-21
Payer: COMMERCIAL

## (undated) DEVICE — HANDPIECE SET WITH COAXIAL HIGH FLOW TIP AND SUCTION TUBE: Brand: INTERPULSE

## (undated) DEVICE — SWAB SPEC COLL SHFT L5.25IN POLYUR FOAM TIP SFT DBL MEDIA

## (undated) DEVICE — TRAP,MUCUS SPECIMEN,40CC: Brand: MEDLINE

## (undated) DEVICE — SPONGE LAP W18XL18IN WHT COT 4 PLY FLD STRUNG RADPQ DISP ST 2 PER PACK

## (undated) DEVICE — 4-PORT MANIFOLD: Brand: NEPTUNE 2

## (undated) DEVICE — GUIDEWIRE VASC L260CM 0.035IN J TIP L3MM PTFE FIX COR NAMIC

## (undated) DEVICE — CANNULA NSL CANN NSL L25FT TBNG AD O2 SUP SFT UC

## (undated) DEVICE — ELECTRODE PT RET AD L9FT HI MOIST COND ADH HYDRGEL CORDED

## (undated) DEVICE — TOWEL,OR,DSP,ST,BLUE,STD,6/PK,12PK/CS: Brand: MEDLINE

## (undated) DEVICE — ANGIOGRAPHIC CATHETER: Brand: EXPO™

## (undated) DEVICE — LEGGINGS, PAIR, 31X48, STERILE: Brand: MEDLINE

## (undated) DEVICE — PAD,ABDOMINAL,5"X9",ST,LF,25/BX: Brand: MEDLINE INDUSTRIES, INC.

## (undated) DEVICE — DRAPE,LAPAROTOMY,PCH,STERILE: Brand: MEDLINE

## (undated) DEVICE — MARKER,SKIN,WI/RULER AND LABELS: Brand: MEDLINE

## (undated) DEVICE — BLADE,STAINLESS-STEEL,11,STRL,DISPOSABLE: Brand: MEDLINE

## (undated) DEVICE — DOUBLE BASIN SET: Brand: MEDLINE INDUSTRIES, INC.

## (undated) DEVICE — CONTAINER SPEC ANAERB VACTNR

## (undated) DEVICE — BLADE,STAINLESS-STEEL,15,STRL,DISPOSABLE: Brand: MEDLINE

## (undated) DEVICE — KIT ANGIO W/ AT P65 PREM HND CTRL FOR CNTRST DEL ANGIOTOUCH

## (undated) DEVICE — PACK SURG CARDIAC CATH

## (undated) DEVICE — PAD, DEFIB, ADULT, RADIOTRAN, PHYSIO, LO: Brand: MEDLINE

## (undated) DEVICE — GLOVE ORANGE PI 7 1/2   MSG9075

## (undated) DEVICE — BAND COMPR L24CM REG CLR PLAS HEMSTAT EXT HK AND LOOP RETEN

## (undated) DEVICE — PACK PROCEDURE SURG GEN CUST

## (undated) DEVICE — CATHETER ANGIO FL3.5 0.045 INX5 FRX100 CM THRULUMEN TRILON

## (undated) DEVICE — SKIN PREP TRAY 4 COMPARTM TRAY: Brand: MEDLINE INDUSTRIES, INC.

## (undated) DEVICE — GAUZE,SPONGE,4"X4",8PLY,STRL,LF,10/TRAY: Brand: MEDLINE

## (undated) DEVICE — KIT MFLD ISOLATN NACL CNTRST PRT TBNG SPIK W/ PRSS TRNSDUC

## (undated) DEVICE — GLIDESHEATH SLENDER ACCESS KIT: Brand: GLIDESHEATH SLENDER